# Patient Record
Sex: MALE | Race: WHITE | Employment: FULL TIME | URBAN - METROPOLITAN AREA
[De-identification: names, ages, dates, MRNs, and addresses within clinical notes are randomized per-mention and may not be internally consistent; named-entity substitution may affect disease eponyms.]

---

## 2022-01-31 ENCOUNTER — OFFICE VISIT (OUTPATIENT)
Dept: ORTHOPEDIC SURGERY | Age: 42
End: 2022-01-31
Payer: COMMERCIAL

## 2022-01-31 ENCOUNTER — HOSPITAL ENCOUNTER (OUTPATIENT)
Dept: ORTHOPEDIC SURGERY | Age: 42
Discharge: HOME | End: 2022-02-02
Payer: COMMERCIAL

## 2022-01-31 VITALS
OXYGEN SATURATION: 98 % | BODY MASS INDEX: 23.25 KG/M2 | HEIGHT: 75 IN | TEMPERATURE: 97.4 F | WEIGHT: 187 LBS | HEART RATE: 66 BPM

## 2022-01-31 DIAGNOSIS — M54.2 NECK PAIN: ICD-10-CM

## 2022-01-31 DIAGNOSIS — M54.12 CERVICAL RADICULOPATHY AT C6: Primary | ICD-10-CM

## 2022-01-31 PROCEDURE — 99204 OFFICE O/P NEW MOD 45 MIN: CPT | Performed by: ORTHOPAEDIC SURGERY

## 2022-01-31 PROCEDURE — 73030 X-RAY EXAM OF SHOULDER: CPT | Performed by: ORTHOPAEDIC SURGERY

## 2022-01-31 PROCEDURE — 72040 X-RAY EXAM NECK SPINE 2-3 VW: CPT

## 2022-01-31 PROCEDURE — 73070 X-RAY EXAM OF ELBOW: CPT

## 2022-01-31 PROCEDURE — 1036F TOBACCO NON-USER: CPT | Performed by: ORTHOPAEDIC SURGERY

## 2022-01-31 PROCEDURE — 72040 X-RAY EXAM NECK SPINE 2-3 VW: CPT | Performed by: ORTHOPAEDIC SURGERY

## 2022-01-31 PROCEDURE — G8427 DOCREV CUR MEDS BY ELIG CLIN: HCPCS | Performed by: ORTHOPAEDIC SURGERY

## 2022-01-31 PROCEDURE — 73070 X-RAY EXAM OF ELBOW: CPT | Performed by: ORTHOPAEDIC SURGERY

## 2022-01-31 PROCEDURE — 73030 X-RAY EXAM OF SHOULDER: CPT

## 2022-01-31 PROCEDURE — 73080 X-RAY EXAM OF ELBOW: CPT | Performed by: ORTHOPAEDIC SURGERY

## 2022-01-31 PROCEDURE — 73080 X-RAY EXAM OF ELBOW: CPT

## 2022-01-31 PROCEDURE — G8484 FLU IMMUNIZE NO ADMIN: HCPCS | Performed by: ORTHOPAEDIC SURGERY

## 2022-01-31 PROCEDURE — G8420 CALC BMI NORM PARAMETERS: HCPCS | Performed by: ORTHOPAEDIC SURGERY

## 2022-01-31 RX ORDER — OMEPRAZOLE 20 MG/1
CAPSULE, DELAYED RELEASE ORAL
COMMUNITY
Start: 2021-05-04

## 2022-01-31 RX ORDER — ALBUTEROL SULFATE 90 UG/1
2 AEROSOL, METERED RESPIRATORY (INHALATION)
COMMUNITY

## 2022-01-31 RX ORDER — DEXAMETHASONE 0.5 MG/1
0.5 TABLET ORAL EVERY 6 HOURS
Qty: 40 TABLET | Refills: 0 | Status: SHIPPED | OUTPATIENT
Start: 2022-01-31 | End: 2022-02-10

## 2022-01-31 RX ORDER — RIBOFLAVIN (VITAMIN B2) 100 MG
TABLET ORAL
COMMUNITY
Start: 2022-01-25

## 2022-01-31 RX ORDER — ERGOCALCIFEROL 1.25 MG/1
CAPSULE ORAL
COMMUNITY
Start: 2021-12-16

## 2022-01-31 RX ORDER — PAROXETINE 10 MG/1
TABLET, FILM COATED ORAL
COMMUNITY
Start: 2022-01-21

## 2022-01-31 NOTE — PROGRESS NOTES
Subjective:      Patient ID: Douglas Brody is a 46 y.o. male who presents today for:  Chief Complaint   Patient presents with    Shoulder Pain     left shoulder an elbow pain. No recent xray. He wears braces on both elbos with no relief. Pt states he has a very physical job. HPI  Patient essentially states he has pain everywhere. Reports tingling and pain shooting essentially from his neck down into his hands. Acknowledges weakness in his bilateral elbows as well as his left shoulder when lifting things. Denies any injury associated with the onset of these events and has been gradually increasing over time. Reports worse with lifting activities at work. History reviewed. No pertinent past medical history. History reviewed. No pertinent surgical history. Social History     Socioeconomic History    Marital status: Single     Spouse name: Not on file    Number of children: Not on file    Years of education: Not on file    Highest education level: Not on file   Occupational History    Not on file   Tobacco Use    Smoking status: Former Smoker     Packs/day: 2.00     Years: 20.00     Pack years: 40.00     Types: Cigarettes     Start date: 5/12/1998     Quit date: 5/12/2018     Years since quitting: 3.7    Smokeless tobacco: Former User   Vaping Use    Vaping Use: Every day   Substance and Sexual Activity    Alcohol use: Not Currently    Drug use: Not on file    Sexual activity: Not on file   Other Topics Concern    Not on file   Social History Narrative    Not on file     Social Determinants of Health     Financial Resource Strain:     Difficulty of Paying Living Expenses: Not on file   Food Insecurity:     Worried About [address] in the Last Year: Not on file    [address] in the Last Year: Not on file   Transportation Needs:     Lack of Transportation (Medical): Not on file    Lack of Transportation (Non-Medical):  Not on file   Physical Activity:     Days of Exercise per Week: Not on file    Minutes of Exercise per Session: Not on file   Stress:     Feeling of Stress : Not on file   Social Connections:     Frequency of Communication with Friends and Family: Not on file    Frequency of Social Gatherings with Friends and Family: Not on file    Attends Bahai Services: Not on file    Active Member of Clubs or Organizations: Not on file    Attends Club or Organization Meetings: Not on file    Marital Status: Not on file   Intimate Partner Violence:     Fear of Current or Ex-Partner: Not on file    Emotionally Abused: Not on file    Physically Abused: Not on file    Sexually Abused: Not on file   Housing Stability:     Unable to Pay for Housing in the Last Year: Not on file    Number of [place] in the Last Year: Not on file    Unstable Housing in the Last Year: Not on file     History reviewed. No pertinent family history. Allergies   Allergen Reactions    Penicillins      rash     Current Outpatient Medications on File Prior to Visit   Medication Sig Dispense Refill    PARoxetine (PAXIL) 10 MG tablet       Riboflavin (B-2) 100 MG TABS       omeprazole (PRILOSEC) 20 MG delayed release capsule TAKE 1 CAPSULE BY MOUTH EVERY DAY      vitamin D (ERGOCALCIFEROL) 1.25 MG (32129 UT) CAPS capsule       albuterol sulfate  (90 Base) MCG/ACT inhaler Inhale 2 puffs into the lungs       No current facility-administered medications on file prior to visit. Review of Systems      Objective:   Pulse 66   Temp 97.4 °F (36.3 °C) (Temporal)   Ht 6' 3\" (1.905 m)   Wt 187 lb (84.8 kg)   SpO2 98%   BMI 23.37 kg/m²     Ortho Exam    Left upper extremity:  Skin: Intact circumferentially, no swelling, ecchymosis or edema is appreciated  Neuro: Sensation intact light touch in the radial, ulnar and median nerve distribution. Able to perform all cardinal hand movements. Vascular: Strong palpable radial pulse, brisk cap refill in all digits.   Patient has symmetric 1+ biceps reflex bilaterally with a negative Rosendo sign. MSK: Patient is tender palpation about the paraspinal musculature in the left paraspinal region. Patient somewhat globally tender palpation about the left upper extremity and what appears to be C5 and C6 nerve distribution      Right upper extremity:  Skin: Intact circumferentially, no swelling, ecchymosis or edema is appreciated  Neuro: Sensation intact light touch in the radial, ulnar and median nerve distribution. Able to perform all cardinal hand movements. Patient has symmetric 1+ biceps reflex bilaterally with a negative Rosendo sign. Vascular: Strong palpable radial pulse, brisk cap refill in all digits. MSK:  Patient is tender palpation about the paraspinal musculature in the left paraspinal region.   Patient somewhat globally tender palpation about the left upper extremity and what appears to be C5 and C6 nerve distribution    Radiographs and Laboratory Studies:     Diagnostic Imaging Studies:    AP, oblique and lateral imaging the right elbow was obtained on 1/31/2022 to evaluate for causes of right elbow pain    Imaging demonstrates normal radiographic appearing elbow with no evidence of fracture or dislocation    AP, oblique and lateral imaging left elbow was obtained on 1/31/2020 225 for causes of left elbow pain    Imaging demonstrates normal radiograph appearing elbow with no evidence of fracture or dislocation    AP, axillary and scapular Y views of the left shoulder was obtained on 1/31/2022 to evaluate for cause of left shoulder pain    Imaging demonstrates prior healed fracture of the midshaft of the left clavicle which could be radiographic overlay given the been in the clavicle however no evidence of acute fracture dislocation and no evidence of glenohumeral arthritis    AP and lateral views of the cervical spine were obtained on 1/31/2022 to evaluate for causes of bilateral upper extremity pain    Imaging demonstrates advanced arthritic change with loss of lordotic curvature and advanced generative change with loss of disc space height between C5 and C6 and C6 and C7 with uncovertebral anterior osteophyte    Laboratory Studies:   No results found for: WBC, HGB, HCT, MCV, PLT  No results found for: SEDRATE  No results found for: CRP    Assessment:       Diagnosis Orders   1. Cervical radiculopathy at C6  Ambulatory referral to Physical Therapy   2. Neck pain  XR CERVICAL SPINE (2-3 VIEWS)    XR SHOULDER LEFT (MIN 2 VIEWS)    XR ELBOW RIGHT (MIN 3 VIEWS)    XR ELBOW LEFT (2 VIEWS)          Plan:     Patient appears to be experiencing bilateral cervical radicular pain as a result of arthritis in his neck with associated weakness. Patient has not experienced an injury and this is a chronic condition at baseline. I see no reason to place patient on any restrictions from my standpoint. Patient does have a very strenuous job and overall may be better for patient in the long run if he transitions to a less heavy duty type of work. However, I have no reason to restrict him from activity at this juncture. We will treat patient with a brief course of oral steroid medication to help with symptoms. If no improvement would recommend further evaluation with MRI of the cervical spine to evaluate for possible intervention.     Orders Placed This Encounter   Procedures    XR CERVICAL SPINE (2-3 VIEWS)     Standing Status:   Future     Number of Occurrences:   1     Standing Expiration Date:   1/31/2023     Scheduling Instructions:      AP and lateral     Order Specific Question:   Reason for exam:     Answer:   neck pain    XR SHOULDER LEFT (MIN 2 VIEWS)     Standing Status:   Future     Number of Occurrences:   1     Standing Expiration Date:   1/31/2023     Scheduling Instructions:      AP,  Outlet, axillary     Order Specific Question:   Reason for exam:     Answer:   shoulder pain    XR ELBOW RIGHT (MIN 3 VIEWS)     Standing Status: Future     Number of Occurrences:   1     Standing Expiration Date:   1/31/2023     Scheduling Instructions:      AP, lateral, oblique of right elbow     Order Specific Question:   Reason for exam:     Answer:   elbow pain    XR ELBOW LEFT (2 VIEWS)     Standing Status:   Future     Number of Occurrences:   1     Standing Expiration Date:   1/31/2023    Ambulatory referral to Physical Therapy     Referral Priority:   Routine     Referral Type:   Eval and Treat     Referral Reason:   Specialty Services Required     Requested Specialty:   Physical Therapy     Number of Visits Requested:   1     Orders Placed This Encounter   Medications    dexamethasone (DECADRON) 0.5 MG tablet     Sig: Take 1 tablet by mouth every 6 hours for 10 days     Dispense:  40 tablet     Refill:  0       No follow-ups on file.       Benigno Napoles MD

## 2023-12-20 ENCOUNTER — APPOINTMENT (OUTPATIENT)
Dept: PRIMARY CARE | Facility: CLINIC | Age: 43
End: 2023-12-20
Payer: COMMERCIAL

## 2024-11-18 ENCOUNTER — HOSPITAL ENCOUNTER (INPATIENT)
Age: 44
LOS: 9 days | Discharge: HOME | DRG: 885 | End: 2024-11-27
Attending: EMERGENCY MEDICINE | Admitting: PSYCHIATRY & NEUROLOGY
Payer: MEDICARE

## 2024-11-18 DIAGNOSIS — F20.0 PARANOID SCHIZOPHRENIA (HCC): Primary | ICD-10-CM

## 2024-11-18 LAB
ALBUMIN SERPL-MCNC: 4.3 G/DL (ref 3.5–4.6)
ALP SERPL-CCNC: 71 U/L (ref 35–104)
ALT SERPL-CCNC: 10 U/L (ref 0–41)
AMPHET UR QL SCN: NORMAL
ANION GAP SERPL CALCULATED.3IONS-SCNC: 6 MEQ/L (ref 9–15)
APAP SERPL-MCNC: <5 UG/ML (ref 10–30)
AST SERPL-CCNC: 15 U/L (ref 0–40)
BARBITURATES UR QL SCN: NORMAL
BASOPHILS # BLD: 0 K/UL (ref 0–0.2)
BASOPHILS NFR BLD: 0.2 %
BENZODIAZ UR QL SCN: NORMAL
BILIRUB SERPL-MCNC: <0.2 MG/DL (ref 0.2–0.7)
BILIRUB UR QL STRIP: NEGATIVE
BUN SERPL-MCNC: 20 MG/DL (ref 6–20)
CALCIUM SERPL-MCNC: 9.8 MG/DL (ref 8.5–9.9)
CANNABINOIDS UR QL SCN: NORMAL
CHLORIDE SERPL-SCNC: 104 MEQ/L (ref 95–107)
CHOLEST SERPL-MCNC: 174 MG/DL (ref 0–199)
CK SERPL-CCNC: 206 U/L (ref 0–190)
CLARITY UR: CLEAR
CO2 SERPL-SCNC: 30 MEQ/L (ref 20–31)
COCAINE UR QL SCN: NORMAL
COLOR UR: YELLOW
CREAT SERPL-MCNC: 1.1 MG/DL (ref 0.7–1.2)
DRUG SCREEN COMMENT UR-IMP: NORMAL
EOSINOPHIL # BLD: 0.1 K/UL (ref 0–0.7)
EOSINOPHIL NFR BLD: 1.3 %
ERYTHROCYTE [DISTWIDTH] IN BLOOD BY AUTOMATED COUNT: 12.1 % (ref 11.5–14.5)
ETHANOL PERCENT: NORMAL G/DL
ETHANOLAMINE SERPL-MCNC: <10 MG/DL (ref 0–0.08)
FENTANYL SCREEN, URINE: NORMAL
GLOBULIN SER CALC-MCNC: 3.5 G/DL (ref 2.3–3.5)
GLUCOSE SERPL-MCNC: 83 MG/DL (ref 70–99)
GLUCOSE UR STRIP-MCNC: NEGATIVE MG/DL
HCT VFR BLD AUTO: 44 % (ref 42–52)
HDLC SERPL-MCNC: 46 MG/DL (ref 40–59)
HGB BLD-MCNC: 14.7 G/DL (ref 14–18)
HGB UR QL STRIP: NEGATIVE
KETONES UR STRIP-MCNC: ABNORMAL MG/DL
LDLC SERPL CALC-MCNC: 100 MG/DL (ref 0–129)
LEUKOCYTE ESTERASE UR QL STRIP: NEGATIVE
LYMPHOCYTES # BLD: 1.4 K/UL (ref 1–4.8)
LYMPHOCYTES NFR BLD: 15.7 %
MCH RBC QN AUTO: 31.1 PG (ref 27–31.3)
MCHC RBC AUTO-ENTMCNC: 33.4 % (ref 33–37)
MCV RBC AUTO: 93.2 FL (ref 79–92.2)
METHADONE UR QL SCN: NORMAL
MONOCYTES # BLD: 0.6 K/UL (ref 0.2–0.8)
MONOCYTES NFR BLD: 6.2 %
NEUTROPHILS # BLD: 6.9 K/UL (ref 1.4–6.5)
NEUTS SEG NFR BLD: 76.4 %
NITRITE UR QL STRIP: NEGATIVE
OPIATES UR QL SCN: NORMAL
OXYCODONE UR QL SCN: NORMAL
PCP UR QL SCN: NORMAL
PH UR STRIP: 5.5 [PH] (ref 5–9)
PLATELET # BLD AUTO: 254 K/UL (ref 130–400)
POTASSIUM SERPL-SCNC: 4.5 MEQ/L (ref 3.4–4.9)
PROPOXYPH UR QL SCN: NORMAL
PROT SERPL-MCNC: 7.8 G/DL (ref 6.3–8)
PROT UR STRIP-MCNC: ABNORMAL MG/DL
RBC # BLD AUTO: 4.72 M/UL (ref 4.7–6.1)
SALICYLATES SERPL-MCNC: <0.3 MG/DL (ref 15–30)
SODIUM SERPL-SCNC: 140 MEQ/L (ref 135–144)
SP GR UR STRIP: 1.03 (ref 1–1.03)
TRIGL SERPL-MCNC: 141 MG/DL (ref 0–150)
TSH SERPL-MCNC: 1.57 UIU/ML (ref 0.44–3.86)
URINE REFLEX TO CULTURE: ABNORMAL
UROBILINOGEN UR STRIP-ACNC: 0.2 E.U./DL
WBC # BLD AUTO: 9.1 K/UL (ref 4.8–10.8)

## 2024-11-18 PROCEDURE — 82077 ASSAY SPEC XCP UR&BREATH IA: CPT

## 2024-11-18 PROCEDURE — 80179 DRUG ASSAY SALICYLATE: CPT

## 2024-11-18 PROCEDURE — 85025 COMPLETE CBC W/AUTO DIFF WBC: CPT

## 2024-11-18 PROCEDURE — 93005 ELECTROCARDIOGRAM TRACING: CPT | Performed by: EMERGENCY MEDICINE

## 2024-11-18 PROCEDURE — 6370000000 HC RX 637 (ALT 250 FOR IP): Performed by: PSYCHIATRY & NEUROLOGY

## 2024-11-18 PROCEDURE — 1240000000 HC EMOTIONAL WELLNESS R&B

## 2024-11-18 PROCEDURE — 84443 ASSAY THYROID STIM HORMONE: CPT

## 2024-11-18 PROCEDURE — 80061 LIPID PANEL: CPT

## 2024-11-18 PROCEDURE — 82550 ASSAY OF CK (CPK): CPT

## 2024-11-18 PROCEDURE — 81003 URINALYSIS AUTO W/O SCOPE: CPT

## 2024-11-18 PROCEDURE — 80307 DRUG TEST PRSMV CHEM ANLYZR: CPT

## 2024-11-18 PROCEDURE — 80143 DRUG ASSAY ACETAMINOPHEN: CPT

## 2024-11-18 PROCEDURE — 99285 EMERGENCY DEPT VISIT HI MDM: CPT

## 2024-11-18 PROCEDURE — 80053 COMPREHEN METABOLIC PANEL: CPT

## 2024-11-18 PROCEDURE — 83036 HEMOGLOBIN GLYCOSYLATED A1C: CPT

## 2024-11-18 RX ORDER — HYDROXYZINE PAMOATE 50 MG/1
50 CAPSULE ORAL EVERY 6 HOURS PRN
Status: DISCONTINUED | OUTPATIENT
Start: 2024-11-18 | End: 2024-11-27 | Stop reason: HOSPADM

## 2024-11-18 RX ORDER — HALOPERIDOL 5 MG/ML
5 INJECTION INTRAMUSCULAR EVERY 6 HOURS PRN
Status: DISCONTINUED | OUTPATIENT
Start: 2024-11-18 | End: 2024-11-27 | Stop reason: HOSPADM

## 2024-11-18 RX ORDER — MAGNESIUM HYDROXIDE/ALUMINUM HYDROXICE/SIMETHICONE 120; 1200; 1200 MG/30ML; MG/30ML; MG/30ML
30 SUSPENSION ORAL PRN
Status: DISCONTINUED | OUTPATIENT
Start: 2024-11-18 | End: 2024-11-27 | Stop reason: HOSPADM

## 2024-11-18 RX ORDER — TRAZODONE HYDROCHLORIDE 50 MG/1
50 TABLET, FILM COATED ORAL NIGHTLY PRN
Status: DISCONTINUED | OUTPATIENT
Start: 2024-11-18 | End: 2024-11-27 | Stop reason: HOSPADM

## 2024-11-18 RX ORDER — BENZTROPINE MESYLATE 1 MG/ML
2 INJECTION, SOLUTION INTRAMUSCULAR; INTRAVENOUS 2 TIMES DAILY PRN
Status: DISCONTINUED | OUTPATIENT
Start: 2024-11-18 | End: 2024-11-27 | Stop reason: HOSPADM

## 2024-11-18 RX ORDER — POLYETHYLENE GLYCOL 3350 17 G
2 POWDER IN PACKET (EA) ORAL
Status: DISCONTINUED | OUTPATIENT
Start: 2024-11-18 | End: 2024-11-27 | Stop reason: HOSPADM

## 2024-11-18 RX ORDER — ACETAMINOPHEN 325 MG/1
650 TABLET ORAL EVERY 4 HOURS PRN
Status: DISCONTINUED | OUTPATIENT
Start: 2024-11-18 | End: 2024-11-27 | Stop reason: HOSPADM

## 2024-11-18 RX ORDER — HALOPERIDOL 5 MG/1
5 TABLET ORAL EVERY 6 HOURS PRN
Status: DISCONTINUED | OUTPATIENT
Start: 2024-11-18 | End: 2024-11-27 | Stop reason: HOSPADM

## 2024-11-18 RX ORDER — HYDROXYZINE HYDROCHLORIDE 50 MG/ML
50 INJECTION, SOLUTION INTRAMUSCULAR EVERY 6 HOURS PRN
Status: DISCONTINUED | OUTPATIENT
Start: 2024-11-18 | End: 2024-11-27 | Stop reason: HOSPADM

## 2024-11-18 RX ADMIN — TRAZODONE HYDROCHLORIDE 50 MG: 50 TABLET ORAL at 22:32

## 2024-11-18 ASSESSMENT — SLEEP AND FATIGUE QUESTIONNAIRES
SLEEP PATTERN: DIFFICULTY FALLING ASLEEP;DISTURBED/INTERRUPTED SLEEP;RESTLESSNESS
DO YOU USE A SLEEP AID: NO
DO YOU HAVE DIFFICULTY SLEEPING: YES
AVERAGE NUMBER OF SLEEP HOURS: 8

## 2024-11-18 ASSESSMENT — PATIENT HEALTH QUESTIONNAIRE - PHQ9
SUM OF ALL RESPONSES TO PHQ QUESTIONS 1-9: 0
2. FEELING DOWN, DEPRESSED OR HOPELESS: NOT AT ALL
SUM OF ALL RESPONSES TO PHQ9 QUESTIONS 1 & 2: 0
SUM OF ALL RESPONSES TO PHQ QUESTIONS 1-9: 0
1. LITTLE INTEREST OR PLEASURE IN DOING THINGS: NOT AT ALL
SUM OF ALL RESPONSES TO PHQ QUESTIONS 1-9: 0
SUM OF ALL RESPONSES TO PHQ QUESTIONS 1-9: 0

## 2024-11-18 ASSESSMENT — PAIN - FUNCTIONAL ASSESSMENT: PAIN_FUNCTIONAL_ASSESSMENT: NONE - DENIES PAIN

## 2024-11-18 ASSESSMENT — LIFESTYLE VARIABLES
HOW OFTEN DO YOU HAVE A DRINK CONTAINING ALCOHOL: NEVER
HOW MANY STANDARD DRINKS CONTAINING ALCOHOL DO YOU HAVE ON A TYPICAL DAY: PATIENT DOES NOT DRINK

## 2024-11-19 LAB
EKG ATRIAL RATE: 62 BPM
EKG P AXIS: 55 DEGREES
EKG P-R INTERVAL: 162 MS
EKG Q-T INTERVAL: 416 MS
EKG QRS DURATION: 88 MS
EKG QTC CALCULATION (BAZETT): 422 MS
EKG R AXIS: 58 DEGREES
EKG T AXIS: 32 DEGREES
EKG VENTRICULAR RATE: 62 BPM
ESTIMATED AVERAGE GLUCOSE: 108 MG/DL
HBA1C MFR BLD: 5.4 % (ref 4–6)

## 2024-11-19 PROCEDURE — 6370000000 HC RX 637 (ALT 250 FOR IP): Performed by: PSYCHIATRY & NEUROLOGY

## 2024-11-19 PROCEDURE — 1240000000 HC EMOTIONAL WELLNESS R&B

## 2024-11-19 PROCEDURE — 93010 ELECTROCARDIOGRAM REPORT: CPT | Performed by: INTERNAL MEDICINE

## 2024-11-19 PROCEDURE — 99223 1ST HOSP IP/OBS HIGH 75: CPT | Performed by: PSYCHIATRY & NEUROLOGY

## 2024-11-19 RX ORDER — DIVALPROEX SODIUM 250 MG/1
250 TABLET, DELAYED RELEASE ORAL EVERY 8 HOURS SCHEDULED
Status: DISCONTINUED | OUTPATIENT
Start: 2024-11-19 | End: 2024-11-24

## 2024-11-19 RX ORDER — PALIPERIDONE 6 MG/1
6 TABLET, EXTENDED RELEASE ORAL DAILY
Status: DISCONTINUED | OUTPATIENT
Start: 2024-11-19 | End: 2024-11-21

## 2024-11-19 RX ADMIN — TRAZODONE HYDROCHLORIDE 50 MG: 50 TABLET ORAL at 21:12

## 2024-11-19 RX ADMIN — DIVALPROEX SODIUM 250 MG: 250 TABLET, DELAYED RELEASE ORAL at 13:37

## 2024-11-19 RX ADMIN — DIVALPROEX SODIUM 250 MG: 250 TABLET, DELAYED RELEASE ORAL at 21:12

## 2024-11-19 RX ADMIN — PALIPERIDONE 6 MG: 6 TABLET, EXTENDED RELEASE ORAL at 09:58

## 2024-11-19 ASSESSMENT — PATIENT HEALTH QUESTIONNAIRE - PHQ9
SUM OF ALL RESPONSES TO PHQ QUESTIONS 1-9: 2
1. LITTLE INTEREST OR PLEASURE IN DOING THINGS: SEVERAL DAYS
SUM OF ALL RESPONSES TO PHQ QUESTIONS 1-9: 2
SUM OF ALL RESPONSES TO PHQ9 QUESTIONS 1 & 2: 2
SUM OF ALL RESPONSES TO PHQ QUESTIONS 1-9: 2
2. FEELING DOWN, DEPRESSED OR HOPELESS: SEVERAL DAYS
SUM OF ALL RESPONSES TO PHQ QUESTIONS 1-9: 2

## 2024-11-19 NOTE — ED PROVIDER NOTES
[facility] ED  EMERGENCY DEPARTMENT ENCOUNTER      Pt Name: Raúl Fisher  MRN: [id number]  Birthdate 1980  Date of evaluation: 11/18/2024  Provider: Constantine Briceño MD  9:31 PM    CHIEF COMPLAINT       Chief Complaint   Patient presents with    Mental Health Problem     Paranoid, auditory hallucinations, bizarre behaviors, not taking medications.         HISTORY OF PRESENT ILLNESS    Raúl Fsiher is a 44 y.o. male who presents to the emergency department via EMS.  The patient tells me that he is here for help because his last medications were not helping.  He stopped taking them more than a week ago.  He denies SI/HI/AVH.  However his family gives ancillary information that he has been making bizarre statements, making the neighbors nervous, making family feel threatened.  He denies acute medical complaint otherwise.  Please see psychiatric nurse documentation    HPI  Chart review notes previous prescriptions for Paxil  Nursing Notes were reviewed.    REVIEW OF SYSTEMS       Review of Systems   Unable to perform ROS: Psychiatric disorder       Except as noted above the remainder of the review of systems was reviewed and negative.       PAST MEDICAL HISTORY   History reviewed. No pertinent past medical history.      SURGICAL HISTORY     History reviewed. No pertinent surgical history.      CURRENT MEDICATIONS       Previous Medications    ALBUTEROL SULFATE  (90 BASE) MCG/ACT INHALER    Inhale 2 puffs into the lungs    OMEPRAZOLE (PRILOSEC) 20 MG DELAYED RELEASE CAPSULE    TAKE 1 CAPSULE BY MOUTH EVERY DAY    PAROXETINE (PAXIL) 10 MG TABLET        RIBOFLAVIN (B-2) 100 MG TABS        VITAMIN D (ERGOCALCIFEROL) 1.25 MG (51220 UT) CAPS CAPSULE           ALLERGIES     Penicillins    FAMILY HISTORY     History reviewed. No pertinent family history.       SOCIAL HISTORY       Social History     Socioeconomic History    Marital status: Single     Spouse name: None    Number of children: None

## 2024-11-19 NOTE — CONSULTS
non-tender  MUSCULOSKELETAL:  There is no redness, warmth, or swelling of the joints.  Full range of motion noted  NEUROLOGIC:  Awake, alert.  No focal deficits noted  NEUROLOGIC:alert; no deficits  CNII:pupils are reactive to light  CN II, IV, VI-no eye deviation, no diplopia or ptosis  CN V-facial:sensation intact   CN IIIV: Hearing normal  CN IX, X:  Normal gag reflex & phonation  CN XI:Shruggs shoulder & neck rotation intact   CNXII:Tongue midline; no deviation/atrophy  SKIN:  No bruising or bleeding, normal skin color, texture, turgor, no redness, warmth, or swelling, no rashes, and no lesions    Labs    CBC:  Recent Labs     11/18/24 1909   WBC 9.1   RBC 4.72   HGB 14.7   HCT 44.0   MCV 93.2*   RDW 12.1        CHEMISTRIES:  Recent Labs     11/18/24 1909      K 4.5      CO2 30   BUN 20   CREATININE 1.10   GLUCOSE 83     PT/INR:No results for input(s): \"PROTIME\", \"INR\" in the last 72 hours.  APTT:No results for input(s): \"APTT\" in the last 72 hours.  LIVER PROFILE:  Recent Labs     11/18/24 1909   AST 15   ALT 10   BILITOT <0.2   ALKPHOS 71       Imaging/Diagnostics   No results found.    Assessment      Hospital Problems             Last Modified POA    * (Principal) Paranoid schizophrenia (HCC) 11/18/2024 Yes       Plan   Paranoid schizophrenia  -Psychiatry    Elevated CK; 200's  -Increase fluids    Neutrophil 6.9; afebrile  -Denies s/s of infection  -Labs and imaging as needed    Tobacco use  -Commit lozenges as needed; advised to quit smoking    Thank you for consult.  Additional work up or/and treatment plan may be added today or then after based on clinical progression by other providers or specialists.  Patient will need to follow-up with PCP for chronic disease management.     I have spent greater than 70% of time  spent focused exclusively on this patient ,reviewing  chart, labs/diagnostics, reconciling medications, &  answering questions with patient and discussing

## 2024-11-19 NOTE — ED NOTES
Collateral obtained from patient's family:     Mother Avelina Fraser [phone]     Stepfather Boubacar Fraser  [phone]     Brother Cruz Fisher  [phone]    Family states that neighbors \"banged on the door at the house today saying the patient was going into the neighbor's back yard (they see him on their cameras), said patient was saying he wanted to date the 15 year-old daughter of the neighbor, was leaving 'presents' like candy on the neighbor's front porch.\" Neighbors stated to patient's family that it had been going on for awhile. Per family, there was no actual contact between the patient and the 15 year-old. After going to the patient's house, the neighbor's went home and called the police. While neighbor was leaving the patient's home, family states he [the patient] threatened them, saying \"let's take this outside.\" Family was able to redirect patient without difficulty. Family reports that patient has had outbursts and \"lunged at his stepfather\" a couple of days ago. Patient's mother had to get in between them. Patient's brother reports that when patient has his outbursts, it \"takes 5-6 people to hold him down.\" They report that patient has never truly harmed anyone, but he can appear very threatening.    Family reports that patient has been off his medications for at least a week, and \"actually took all of his meds and dropped them off at the police station yesterday.\" Parents and brother state that when patient takes his medications \"he acts totally normal.\" Patient's mother states he told her he quit taking his medications because \"they gave him a headache and make him ejaculate.\" Family states that patient has a history of stopping his meds and they feel it would be better if her were to receive injections instead to ensure compliance. None of patient's family members know what medications patient is supposed to be taking. Family reports that patient had been connected with the [facility]

## 2024-11-19 NOTE — ED NOTES
ED provider Dr Briceño informed of decision to admit.    Call to 3W, spoke with charge nurse Nasrin. Patient will go to room 367.

## 2024-11-19 NOTE — ED NOTES
Patient to 3W accompanied by [facility] RN and [facility] [employer].  All belongings, paperwork, and pink slip given to 3W staff.

## 2024-11-19 NOTE — H&P
[facility] - Department of Psychiatry    History and Physical - Adult         CHIEF COMPLAINT:  Psychosis    History obtained from:  patient    Patient was seen after discussing with the treatment team and reviewing the chart        CIRCUMSTANCES OF ADMISSION:     44M patient with a history of schizophrenia and bipolar disorder presented to ER after contacting the police from home when neighbors confronted him about some of his actions. Patient has no SI, no HI. Patient is restless and hyperactive. Patient is guarded, suspicious, with rapid and pressured speech. Thoughts are disorganized, tangential, and patient jumps from topic to topic with no apparent association between topics. Patient is exhibiting A/V hallucinations and paranoid delusions. Patient reports \"hearing lots of voices through his telephone.\" He says he saw \"neighbors breaking into the house two weeks ago, but no one else knows but me.\" Patient reports \"having a seal on his forehead that disappeared\" and states \"I need to get my head taken care of.\" Patient states \"I have a different heart than anyone else.\" Patient reports that the house that he lives in is \"wired for the past 95 years\" and states \"I know the whole house is bugged.\" Patient states \"I even cleaned out the  drain and I should have told the [facility] about it.\" Patient has difficulty responding to many questions due to his preoccupations/delusions.     Patient was briefly connected to the [facility], but [facility] states patient was discharged from services in early 2024. Prior to DC, patient had been receiving both psychiatry and counseling services. Patient does not have a current mental health provider. Family reports patient stopped taking his medications at least a week ago, but possibly longer. Family reports that when patient consistently takes his medications, \"he acts normal.\" Patient/family do not know what medications patient is supposed to be on.

## 2024-11-19 NOTE — FLOWSHEET NOTE
Pt rates his anxiety and depression 1/10. Pt attends groups, eats meals in dinning room rates his appetite as good, Pt socializes with select peers, Watches TV in the day room. Pt denies SI,HI,AVH. Will continue to monitor.

## 2024-11-19 NOTE — ED NOTES
Patient requested that his mother Avelina and brother Cruz be notified of his admission and gave permission that his HIPAA code be provided to his mother and brother.    Call placed to patient's mother; no answer and mailbox full.    Call placed to patient's brother Cruz. Gave admitting information, [facility] visiting hours/contact info, and HIPAA code. Brother states he will share information with his mother. Brother is very concerned that patient \"not be put on a whole bunch of medications he will just stop taking after he gets better.\" Brother continues to state that they (the family) feel that patient needs a long-acting injection rather than multiple pills. Brother was advised that the psychiatrist would work with patient to ensure that he receives the best medications to manage his symptoms. Brother expressed gratitude for the update.

## 2024-11-19 NOTE — ED NOTES
Provisional Diagnosis:       Schizophrenia, bipolar disorder, paranoid delusions. A/V hallucinations    Psychosocial and Contextual Factors:       Patient was born in [place] and graduated from high school there. He is currently staying in the basement of his parent's home in [place]. Patient is not employed and is not . He receives SSI disability payments.    C-SSRS Summary:    Risk of Suicide: No Risk  1) Within the past month, have you wished you were dead or wished you could go to sleep and not wake up? : No  2) Have you actually had any thoughts of killing yourself? : No  6) Have you ever done anything, started to do anything, or prepared to do anything to end your life?: No     Patient: Patient presents as guarded, suspicious, with rapid and pressured speech. Thoughts are disorganized, tangential, and patient jumps from topic to topic with no apparent association between topics. Patient is exhibiting A/V hallucinations and paranoid delusions. Eye contact is fair. Patient is restless and hyperactive. Hygiene is fair.    Family: Patient's mother, stepfather, and brother present in ER. Family appear to be overwhelmed and upset due to patient's current mental state. Refer to associated collateral note.    Collateral obtained from patient's family:     Mother Avelina Fraser [phone]     Stepfather Boubacar Fraser  [phone]     Brother Cruz Fisher  [phone]     Agency: Patient was briefly connected to the [facility]. Call placed to [facility]; [facility] states patient was discharged from services in early 2024 but prior to DC had been receiving both psychiatry and counseling services. Patient does not have a current mental health provider.    Substance Abuse: Patient reports he does not drink or use drugs at this time. ETOH <10, tox screen negative.    Present Suicidal Behavior:       Verbal: Denies     Attempt: N/A    Past Suicidal Behavior:      Verbal: Denies, none found in records.     Attempt:

## 2024-11-19 NOTE — ED NOTES
Patient notified of plan for admission which will include getting him on the correct medications and working with the psychiatrist and treatment team. Patient responded \"Hey, you work with me and I'll work with you.\" Patient was asked if he is wanting to be admitted and willing to take prescribed medications to which he replied \"I am.\" Patient was advised that he will be going to the third floor for treatment. Patient remains cooperative, talking and laughing with staff, states \"You two [referring to two [facility] nurses] are identical twins, I can tell. You must be from [place].\"

## 2024-11-19 NOTE — ED NOTES
Call to Dr Recinos to review patient's reason for admission, psychiatric history, recent med noncompliance, collateral information provided by family, labs/EKG, and patient's current statements and behaviors. Per Dr Recinos, as long as patient is willing to be admitted and take medications, he may go to 3W with standard PRN medications. Spoke with patient and verified patient is wanting treatment/meds.

## 2024-11-19 NOTE — ED NOTES
Patient to [facility]. Changed into psych safe clothing. Skin Intact but rash noted to abdomen.  No contraband found at this time. Belongings secured and placed into locker 5. Lab notified. Zone 2 notified of new patient. Urine specimen obtained and sent to lab.

## 2024-11-20 PROCEDURE — 99232 SBSQ HOSP IP/OBS MODERATE 35: CPT | Performed by: PSYCHIATRY & NEUROLOGY

## 2024-11-20 PROCEDURE — 6370000000 HC RX 637 (ALT 250 FOR IP): Performed by: PSYCHIATRY & NEUROLOGY

## 2024-11-20 PROCEDURE — 1240000000 HC EMOTIONAL WELLNESS R&B

## 2024-11-20 RX ADMIN — PALIPERIDONE 6 MG: 6 TABLET, EXTENDED RELEASE ORAL at 08:49

## 2024-11-20 RX ADMIN — DIVALPROEX SODIUM 250 MG: 250 TABLET, DELAYED RELEASE ORAL at 13:59

## 2024-11-20 RX ADMIN — TRAZODONE HYDROCHLORIDE 50 MG: 50 TABLET ORAL at 21:00

## 2024-11-20 RX ADMIN — DIVALPROEX SODIUM 250 MG: 250 TABLET, DELAYED RELEASE ORAL at 21:54

## 2024-11-20 RX ADMIN — DIVALPROEX SODIUM 250 MG: 250 TABLET, DELAYED RELEASE ORAL at 05:52

## 2024-11-20 NOTE — FLOWSHEET NOTE
Pt has been visible out on the unit at intervals.Pt does attend select groups. Pt states he is here because some men keep coming into his house and telling lies  about him. Pt has been calm and cooperative . Pt is tangential .Pt states that his house is \"wired\". Pt is encouraged to attend groups and other milieu activities.Pt denies suicidal ideation,intent or harm . Pt denies audio-visual hallucinations. Pt denies HI.

## 2024-11-21 PROBLEM — F25.0 SCHIZOAFFECTIVE DISORDER, BIPOLAR TYPE (HCC): Status: ACTIVE | Noted: 2024-11-18

## 2024-11-21 PROCEDURE — 1240000000 HC EMOTIONAL WELLNESS R&B

## 2024-11-21 PROCEDURE — 6370000000 HC RX 637 (ALT 250 FOR IP): Performed by: PSYCHIATRY & NEUROLOGY

## 2024-11-21 PROCEDURE — 99232 SBSQ HOSP IP/OBS MODERATE 35: CPT | Performed by: PSYCHIATRY & NEUROLOGY

## 2024-11-21 RX ORDER — PALIPERIDONE 9 MG/1
9 TABLET, EXTENDED RELEASE ORAL DAILY
Status: DISCONTINUED | OUTPATIENT
Start: 2024-11-22 | End: 2024-11-24

## 2024-11-21 RX ADMIN — DIVALPROEX SODIUM 250 MG: 250 TABLET, DELAYED RELEASE ORAL at 06:16

## 2024-11-21 RX ADMIN — DIVALPROEX SODIUM 250 MG: 250 TABLET, DELAYED RELEASE ORAL at 14:22

## 2024-11-21 RX ADMIN — PALIPERIDONE 6 MG: 6 TABLET, EXTENDED RELEASE ORAL at 09:46

## 2024-11-21 RX ADMIN — DIVALPROEX SODIUM 250 MG: 250 TABLET, DELAYED RELEASE ORAL at 21:53

## 2024-11-21 NOTE — FLOWSHEET NOTE
Mother called for an update. HIPAA code provided. Mother worried about patient not being compliant with medications at home. Mother's phone number added to emergency contact list.

## 2024-11-21 NOTE — FLOWSHEET NOTE
Pt has been visible out on the unit at intervals. Pt is social with peers and has a bright affect. Pt endorses adequate sleep and appetite. Pt denies suicidal ideation,intent or plan. Pt denies HI.

## 2024-11-22 LAB — VALPROATE SERPL-MCNC: 35.3 UG/ML (ref 50–100)

## 2024-11-22 PROCEDURE — 80164 ASSAY DIPROPYLACETIC ACD TOT: CPT

## 2024-11-22 PROCEDURE — 1240000000 HC EMOTIONAL WELLNESS R&B

## 2024-11-22 PROCEDURE — 99232 SBSQ HOSP IP/OBS MODERATE 35: CPT | Performed by: PSYCHIATRY & NEUROLOGY

## 2024-11-22 PROCEDURE — 6370000000 HC RX 637 (ALT 250 FOR IP): Performed by: PSYCHIATRY & NEUROLOGY

## 2024-11-22 PROCEDURE — 36415 COLL VENOUS BLD VENIPUNCTURE: CPT

## 2024-11-22 RX ADMIN — DIVALPROEX SODIUM 250 MG: 250 TABLET, DELAYED RELEASE ORAL at 13:42

## 2024-11-22 RX ADMIN — PALIPERIDONE 9 MG: 9 TABLET, EXTENDED RELEASE ORAL at 09:37

## 2024-11-22 RX ADMIN — DIVALPROEX SODIUM 250 MG: 250 TABLET, DELAYED RELEASE ORAL at 07:09

## 2024-11-22 NOTE — FLOWSHEET NOTE
Pt has been visible out on the unit and eats meals in the dinning area. Pt endorses fair sleep and improved appetite. Patient showers independently.and ADL'S are intact. Pt does have disorganized thinking and appears internally stimulated at times. Pt is encouraged to attend groups and other milieu activities. Pt denies audio-visual hallucinations. Patient denies SI/HI/  ideation.

## 2024-11-23 PROCEDURE — 6370000000 HC RX 637 (ALT 250 FOR IP): Performed by: PSYCHIATRY & NEUROLOGY

## 2024-11-23 PROCEDURE — 1240000000 HC EMOTIONAL WELLNESS R&B

## 2024-11-23 RX ADMIN — PALIPERIDONE 9 MG: 9 TABLET, EXTENDED RELEASE ORAL at 08:49

## 2024-11-23 RX ADMIN — DIVALPROEX SODIUM 250 MG: 250 TABLET, DELAYED RELEASE ORAL at 21:52

## 2024-11-24 PROCEDURE — 6370000000 HC RX 637 (ALT 250 FOR IP): Performed by: PSYCHIATRY & NEUROLOGY

## 2024-11-24 PROCEDURE — 1240000000 HC EMOTIONAL WELLNESS R&B

## 2024-11-24 RX ORDER — DIVALPROEX SODIUM 500 MG/1
500 TABLET, DELAYED RELEASE ORAL EVERY 12 HOURS SCHEDULED
Status: DISCONTINUED | OUTPATIENT
Start: 2024-11-25 | End: 2024-11-27 | Stop reason: HOSPADM

## 2024-11-24 RX ORDER — PALIPERIDONE 6 MG/1
12 TABLET, EXTENDED RELEASE ORAL DAILY
Status: DISCONTINUED | OUTPATIENT
Start: 2024-11-25 | End: 2024-11-27 | Stop reason: HOSPADM

## 2024-11-24 RX ADMIN — PALIPERIDONE 9 MG: 9 TABLET, EXTENDED RELEASE ORAL at 10:04

## 2024-11-24 RX ADMIN — TRAZODONE HYDROCHLORIDE 50 MG: 50 TABLET ORAL at 20:58

## 2024-11-24 RX ADMIN — DIVALPROEX SODIUM 250 MG: 250 TABLET, DELAYED RELEASE ORAL at 06:01

## 2024-11-24 NOTE — FLOWSHEET NOTE
Pt denies having any anxiety or depression, Pt attends groups and participate, Pt is social with select peers, Pt is visible on unit, watches TV with his peers and eats his meals in dinning room, Rates his appetite as good. Pt denies SI,HI,AVH. Will continue to monitor

## 2024-11-24 NOTE — FLOWSHEET NOTE
Pt rates his anxiety and depression 0/10. Pt attends groups, eats meals in dinning room rates his appetite as good, Pt socializes with select peers, Watches TV in the day room. Pt denies SI,HI,AVH. Will continue to monitor.

## 2024-11-25 PROCEDURE — 99232 SBSQ HOSP IP/OBS MODERATE 35: CPT | Performed by: PSYCHIATRY & NEUROLOGY

## 2024-11-25 PROCEDURE — 6370000000 HC RX 637 (ALT 250 FOR IP): Performed by: NURSE PRACTITIONER

## 2024-11-25 PROCEDURE — 1240000000 HC EMOTIONAL WELLNESS R&B

## 2024-11-25 RX ADMIN — DIVALPROEX SODIUM 500 MG: 250 TABLET, DELAYED RELEASE ORAL at 21:37

## 2024-11-25 RX ADMIN — PALIPERIDONE 12 MG: 6 TABLET, EXTENDED RELEASE ORAL at 09:34

## 2024-11-25 ASSESSMENT — PAIN DESCRIPTION - LOCATION: LOCATION: HEAD

## 2024-11-25 ASSESSMENT — PAIN SCALES - GENERAL: PAINLEVEL_OUTOF10: 1

## 2024-11-25 ASSESSMENT — PAIN DESCRIPTION - DESCRIPTORS: DESCRIPTORS: ACHING

## 2024-11-25 NOTE — FLOWSHEET NOTE
Pt has been intermittently visible out on the unit for meals and watches TV with other peers. Pt endorses adequate sleep and appetite. Pt is encouraged to attend groups and other milieu activities. Pt has been medication compliant and denies suicidal ideation,intent or harm. Pt denies audiovisual hallucinations.

## 2024-11-26 PROCEDURE — 1240000000 HC EMOTIONAL WELLNESS R&B

## 2024-11-26 PROCEDURE — 6370000000 HC RX 637 (ALT 250 FOR IP): Performed by: NURSE PRACTITIONER

## 2024-11-26 PROCEDURE — 90833 PSYTX W PT W E/M 30 MIN: CPT | Performed by: PSYCHIATRY & NEUROLOGY

## 2024-11-26 PROCEDURE — 99232 SBSQ HOSP IP/OBS MODERATE 35: CPT | Performed by: PSYCHIATRY & NEUROLOGY

## 2024-11-26 RX ADMIN — PALIPERIDONE 12 MG: 6 TABLET, EXTENDED RELEASE ORAL at 08:57

## 2024-11-26 RX ADMIN — DIVALPROEX SODIUM 500 MG: 250 TABLET, DELAYED RELEASE ORAL at 08:57

## 2024-11-26 RX ADMIN — DIVALPROEX SODIUM 500 MG: 250 TABLET, DELAYED RELEASE ORAL at 21:19

## 2024-11-26 NOTE — FLOWSHEET NOTE
Pt has been intermittently visible out on the unit and attends groups.Pt reports adequate sleep and appetite. Pt has been medication compliant ,no request for prn medication.Patient played corn hole for group. Pt denies suicidal ideation,intent or plan. Pt denies audiovisual hallucinations.

## 2024-11-27 VITALS
HEART RATE: 82 BPM | BODY MASS INDEX: 23.55 KG/M2 | RESPIRATION RATE: 14 BRPM | HEIGHT: 75 IN | WEIGHT: 189.38 LBS | SYSTOLIC BLOOD PRESSURE: 134 MMHG | DIASTOLIC BLOOD PRESSURE: 87 MMHG | OXYGEN SATURATION: 99 % | TEMPERATURE: 97.4 F

## 2024-11-27 PROCEDURE — 6370000000 HC RX 637 (ALT 250 FOR IP): Performed by: NURSE PRACTITIONER

## 2024-11-27 PROCEDURE — 6360000002 HC RX W HCPCS: Performed by: PSYCHIATRY & NEUROLOGY

## 2024-11-27 PROCEDURE — G0008 ADMIN INFLUENZA VIRUS VAC: HCPCS | Performed by: PSYCHIATRY & NEUROLOGY

## 2024-11-27 PROCEDURE — 99239 HOSP IP/OBS DSCHRG MGMT >30: CPT | Performed by: PSYCHIATRY & NEUROLOGY

## 2024-11-27 PROCEDURE — 90656 IIV3 VACC NO PRSV 0.5 ML IM: CPT | Performed by: PSYCHIATRY & NEUROLOGY

## 2024-11-27 RX ORDER — DIVALPROEX SODIUM 500 MG/1
500 TABLET, DELAYED RELEASE ORAL EVERY 12 HOURS SCHEDULED
Qty: 30 TABLET | Refills: 3 | Status: SHIPPED | OUTPATIENT
Start: 2024-11-27

## 2024-11-27 RX ORDER — PALIPERIDONE 6 MG/1
12 TABLET, EXTENDED RELEASE ORAL DAILY
Qty: 30 TABLET | Refills: 0 | Status: SHIPPED | OUTPATIENT
Start: 2024-11-28

## 2024-11-27 RX ADMIN — DIVALPROEX SODIUM 500 MG: 250 TABLET, DELAYED RELEASE ORAL at 08:55

## 2024-11-27 RX ADMIN — INFLUENZA A VIRUS A/VICTORIA/4897/2022 IVR-238 (H1N1) ANTIGEN (PROPIOLACTONE INACTIVATED), INFLUENZA A VIRUS A/THAILAND/8/2022 IVR-237 (H3N2) ANTIGEN (PROPIOLACTONE INACTIVATED), INFLUENZA B VIRUS B/AUSTRIA/1359417/2021 BVR-26 ANTIGEN (PROPIOLACTONE INACTIVATED) 0.5 ML: 15; 15; 15 INJECTION, SUSPENSION INTRAMUSCULAR at 11:30

## 2024-11-27 RX ADMIN — PALIPERIDONE 12 MG: 6 TABLET, EXTENDED RELEASE ORAL at 08:55

## 2024-11-27 NOTE — DISCHARGE INSTR - DIET

## 2024-11-27 NOTE — DISCHARGE INSTRUCTIONS
Someone from [facility] will be calling you tomorrow to follow up on your care. If you don't hear from us, give us a call! [phone].    Keep all follow up appointments, take medications as ordered, utilize positive supports, abstain from use of alcohol and drugs. If symptoms return or you feel at risk to yourself or others, please call 911, return the nearest emergency room, or call your local crisis hotline:  [facility]: [phone]  [facility]: [phone]  [facility]: [phone]    Due to the Covid-19 Pandemic, [facility]oking [facility]essation [facility] is not currently available. For assistance with quitting smoking please go to https://smokefree.gov. A prescription for an FDA-approved tobacco cessation medication was offered at discharge and the patient refused.    You were given a flu vaccine
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
## 2024-11-27 NOTE — PROGRESS NOTES
[facility]  BEHAVIORAL HEALTH FOLLOW-UP NOTE       2024     Patient was seen and examined in person, Chart reviewed   Patient's case discussed with staff/team    Chief Complaint: Psychosis    Interim History:   Patient is seen sitting at a table by himself in the milieu.  He remains disorganized not making much sense.  He tells me that he is just \"waiting for my surgery.\"  I asked what surgery he is having done he states he has to have his brain sealed and then starts talking about his family moving closer and talk at the house being bugged.  He is disorganized flights of ideas he is delusional and illogical.  He had refused the Depakote yesterday indicating that it was for seizures but he started taking it again today.      Appetite:   [] Normal/Unchanged  [] Increased  [x] Decreased      Sleep:       [] Normal/Unchanged  [x] Fair       [] Poor              Energy:    [] Normal/Unchanged  [] Increased  [x] Decreased        SI [] Present  [x] Absent    HI  []Present  [x] Absent     Aggression:  [] yes  [x] no    Patient is [x] able  [] unable to CONTRACT FOR SAFETY     PAST MEDICAL/PSYCHIATRIC HISTORY:   History reviewed. No pertinent past medical history.    FAMILY/SOCIAL HISTORY:  History reviewed. No pertinent family history.  Social History     Socioeconomic History    Marital status: Single     Spouse name: Not on file    Number of children: Not on file    Years of education: Not on file    Highest education level: Not on file   Occupational History    Not on file   Tobacco Use    Smoking status: Former     Current packs/day: 0.00     Average packs/day: 2.0 packs/day for 20.0 years (40.0 ttl pk-yrs)     Types: Cigarettes     Start date: 1998     Quit date: 2018     Years since quittin.5    Smokeless tobacco: Former   Vaping Use    Vaping status: Every Day   Substance and Sexual Activity    Alcohol use: Not Currently    Drug use: Not Currently    Sexual activity: Not 
               [facility]  BEHAVIORAL HEALTH FOLLOW-UP NOTE       2024     Patient was seen and examined in person, Chart reviewed   Patient's case discussed with staff/team    Chief Complaint: Psychosis    Interim History:   Patient seen up on the unit he remains paranoid psychotic internally stimulated.  He is disorganized makes bizarre statements about State law and not allowing you to have your cell phone or to have house codes is not working.  He is refusing Depakote stating it is for seizures and he does not have seizures.  He is concrete would not allow you to explain that the medication will also help with mood.  I offer him lithium and he also refuses that.  He says by himself preoccupied bizarre paranoid and internally stimulated      Appetite:   [] Normal/Unchanged  [] Increased  [x] Decreased      Sleep:       [] Normal/Unchanged  [x] Fair       [] Poor              Energy:    [] Normal/Unchanged  [] Increased  [x] Decreased        SI [] Present  [x] Absent    HI  []Present  [x] Absent     Aggression:  [] yes  [x] no    Patient is [x] able  [] unable to CONTRACT FOR SAFETY     PAST MEDICAL/PSYCHIATRIC HISTORY:   History reviewed. No pertinent past medical history.    FAMILY/SOCIAL HISTORY:  History reviewed. No pertinent family history.  Social History     Socioeconomic History    Marital status: Single     Spouse name: Not on file    Number of children: Not on file    Years of education: Not on file    Highest education level: Not on file   Occupational History    Not on file   Tobacco Use    Smoking status: Former     Current packs/day: 0.00     Average packs/day: 2.0 packs/day for 20.0 years (40.0 ttl pk-yrs)     Types: Cigarettes     Start date: 1998     Quit date: 2018     Years since quittin.5    Smokeless tobacco: Former   Vaping Use    Vaping status: Every Day   Substance and Sexual Activity    Alcohol use: Not Currently    Drug use: Not Currently    Sexual 
               [facility]  BEHAVIORAL HEALTH FOLLOW-UP NOTE       2024     Patient was seen and examined in person, Chart reviewed   Patient's case discussed with staff/team    Chief Complaint: Psychosis    Interim History:     No change in his presentation since yesterday  Mood anxious  Disorganized thought process  Paranoid thoughts ++  Internally stimulated  Appetite:   [] Normal/Unchanged  [] Increased  [x] Decreased      Sleep:       [] Normal/Unchanged  [x] Fair       [] Poor              Energy:    [] Normal/Unchanged  [] Increased  [x] Decreased        SI [] Present  [x] Absent    HI  []Present  [x] Absent     Aggression:  [] yes  [x] no    Patient is [x] able  [] unable to CONTRACT FOR SAFETY     PAST MEDICAL/PSYCHIATRIC HISTORY:   History reviewed. No pertinent past medical history.    FAMILY/SOCIAL HISTORY:  History reviewed. No pertinent family history.  Social History     Socioeconomic History    Marital status: Single     Spouse name: Not on file    Number of children: Not on file    Years of education: Not on file    Highest education level: Not on file   Occupational History    Not on file   Tobacco Use    Smoking status: Former     Current packs/day: 0.00     Average packs/day: 2.0 packs/day for 20.0 years (40.0 ttl pk-yrs)     Types: Cigarettes     Start date: 1998     Quit date: 2018     Years since quittin.5    Smokeless tobacco: Former   Vaping Use    Vaping status: Every Day   Substance and Sexual Activity    Alcohol use: Not Currently    Drug use: Not Currently    Sexual activity: Not on file   Other Topics Concern    Not on file   Social History Narrative    Not on file     Social Determinants of Health     Financial Resource Strain: Not on file   Food Insecurity: No Food Insecurity (2024)    Hunger Vital Sign     Worried About Running Out of Food in the Last Year: Never true     Ran Out of Food in the Last Year: Never true   Transportation Needs: No 
               [facility]  BEHAVIORAL HEALTH FOLLOW-UP NOTE       2024     Patient was seen and examined in person, Chart reviewed   Patient's case discussed with staff/team    Chief Complaint: Psychosis    Interim History:   Pt report feeling better  Less depressed  No AH or VH  Less disorganization of thoughts   Pt agreeable to take medication in shot form    Appetite:   [] Normal/Unchanged  [] Increased  [x] Decreased      Sleep:       [] Normal/Unchanged  [x] Fair       [] Poor              Energy:    [] Normal/Unchanged  [] Increased  [x] Decreased        SI [] Present  [x] Absent    HI  []Present  [x] Absent     Aggression:  [] yes  [x] no    Patient is [x] able  [] unable to CONTRACT FOR SAFETY     PAST MEDICAL/PSYCHIATRIC HISTORY:   History reviewed. No pertinent past medical history.    FAMILY/SOCIAL HISTORY:  History reviewed. No pertinent family history.  Social History     Socioeconomic History    Marital status: Single     Spouse name: Not on file    Number of children: Not on file    Years of education: Not on file    Highest education level: Not on file   Occupational History    Not on file   Tobacco Use    Smoking status: Former     Current packs/day: 0.00     Average packs/day: 2.0 packs/day for 20.0 years (40.0 ttl pk-yrs)     Types: Cigarettes     Start date: 1998     Quit date: 2018     Years since quittin.5    Smokeless tobacco: Former   Vaping Use    Vaping status: Every Day   Substance and Sexual Activity    Alcohol use: Not Currently    Drug use: Not Currently    Sexual activity: Not on file   Other Topics Concern    Not on file   Social History Narrative    Not on file     Social Determinants of Health     Financial Resource Strain: Not on file   Food Insecurity: No Food Insecurity (2024)    Hunger Vital Sign     Worried About Running Out of Food in the Last Year: Never true     Ran Out of Food in the Last Year: Never true   Transportation Needs: No 
      Behavioral Services  Medicare Certification Upon Admission    I certify that this patient's inpatient psychiatric hospital admission is medically necessary for:    [x] (1) Treatment which could reasonably be expected to improve this patient's condition,       [x] (2) Or for diagnostic study;     AND     [x](2) The inpatient psychiatric services are provided while the individual is under the care of a physician and are included in the individualized plan of care.    Estimated length of stay/service 3-5    Plan for post-hospital care OP care    Electronically signed by ENOCH KILPATRICK MD on 11/19/2024 at 9:27 AM      
CLINICAL PHARMACY NOTE: MEDS TO BEDS    Total # of Prescriptions Filled: 2   The following medications were delivered to the patient:  Divalproex Dr 500 mg Tab  Paliperidone Er 6 mg Tab    Additional Documentation:    
Despite encouragement did not attend 1630 recreational group,  
Discharge instructions given verbally and in writing. All questions addressed. Patient  stated understanding of instructions. Personal belongings listed was stated by patient to be correct and pt signed as such. All personal belongings were returned as patient exited the unit.    Patient was escorted to taxi for transportation home.   Patient was offered but declined flu vaccine.  
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  
Explained and gave am invega, pt denied questions, denied pain or extra anxiety, groups were encouraged,  
Explained and gave newly ordered invega 234mg pt wanted in the rt delt. Pt earlene well w/o complaints ,denied questions,   
Patient refused his morning Depakote  
Patient's nurse notified of inappropriate statements patient made towards a peer (AB) after another patient (GUSTAVO) confided in this writer.   
Pt agreed to admission assessment. Pt is alert to self,, month,year. Pt has difficulty focusing on questions asked by staff, disorganized,tangential. Pt denies SI or SA. Pt denied A/V/T hallucinations,then stated,''95 people  in my house.I hear them. When Im lying on my bed,I feel them on my bed. I kicked them off and yelled at them.'' This writer questioned pt how he sleeps at night,pt stated,'' I think 8 hr. I have difficulty sleeping,its stressful and irritating.'' Pt endorses an increased appetite. Pt states wears glasses,thou left them at home. Pt had good eye contact and agreeable to deyrel 50mg po for sleep at 2232.  Unit paperwork with explanation of HIPAA Code and Pt Rights given. Per Catherine العلي in ER pt had her give mom and bro his HIPAA Code.   
Pt arrived on unit at 2214 via wheelchair. This writer and Catherine العلي completed skin check, small red raised areas,rash-vs- bites noted on upper left chest and abdomen. Negative for contraband. Tour of unit and room given.   
Pt is noted to be visible on the unit, with several periods of found to be napping, after 2 pm Depakote was given, pt stated Im not taking this any more, pt states he feels to sleepy, pt was reminded the sleepiness should go away, pt stated he was having brain surgery today, and reports he has had a chip in his neck since age 14. pt denied all depression anxiety suicidal thoughts voices, was seen to be internal stimulated, pt stays to him self, nonsocial.   
Pt mom Avelina stated pt can not go home with his brother related to him being on a lease and he can not have any one living with him. Contact info [phone].  
Pt talked about getting the chip out of his head, reminded pt animals may have chips, but not humans and pt laughed, stating somehow his mom did it at age 14, pt stated med change has helped him that he is not as sleepy.  
Pt's mom called and asked \"are you going to put him in a group home? Because he was verbally threatening the neighbors and the neighbor across the street keeps staring over here.\" This RN informed mom that pt requesting to live with his brother. Mom states, \"Oh my gosh. I don't even know if that's an option. I'll have to get back to you on that.\"  
Pt's mom called to reiterate that she is worried about pt coming home. She would like him to be placed in a group home. States the neighbor across the street keeps making statements to them and telling family she is going to press charges against him. Mom states pt will throw away meds when he gets home and thinks he should be somewhere where they can manage his medications   
Report per Catherine العلي ,44M patient with a history of schizophrenia and bipolar disorder presented to ER after contacting the police from home when neighbors confronted him about some of his actions. Patient has no SI, no HI. Patient is restless and hyperactive. Patient is guarded, suspicious, with rapid and pressured speech. Thoughts are disorganized, tangential, and patient jumps from topic to topic with no apparent association between topics. Patient is exhibiting A/V hallucinations and paranoid delusions. Patient reports \"hearing lots of voices through his telephone.\" He says he saw \"neighbors breaking into the house two weeks ago, but no one else knows but me.\" Patient reports \"having a seal on his forehead that disappeared\" and states \"I need to get my head taken care of.\" Patient states \"I have a different heart than anyone else.\" Patient reports that the house that he lives in is \"wired for the past 95 years\" and states \"I know the whole house is bugged.\" Patient states \"I even cleaned out the  drain and I should have told the [facility] about it.\" Patient has difficulty responding to many questions due to his preoccupations/delusions.   Patient was briefly connected to the [facility], but [facility] states patient was discharged from services in early 2024. Prior to DC, patient had been receiving both psychiatry and counseling services. Patient does not have a current mental health provider. Family reports patient stopped taking his medications at least a week ago, but possibly longer. Family reports that when patient consistently takes his medications, \"he acts normal.\" Patient/family do not know what medications patient is supposed to be on. TOXIC-,ETOH-. Pt being admitted per Dr Recinos,Dx Bipolar D.O./Paranoid Delusions/A/V Hallucinations. Emergency admit.  
Insecurity: No Food Insecurity (11/19/2024)    Hunger Vital Sign     Worried About Running Out of Food in the Last Year: Never true     Ran Out of Food in the Last Year: Never true   Transportation Needs: No Transportation Needs (11/19/2024)    PRAPARE - Transportation     Lack of Transportation (Medical): No     Lack of Transportation (Non-Medical): No   Physical Activity: Not on file   Stress: Not on file   Social Connections: Not on file   Intimate Partner Violence: Not on file   Housing Stability: Unknown (11/19/2024)    Housing Stability Vital Sign     Unable to Pay for Housing in the Last Year: No     Number of Times Moved in the Last Year: Not on file     Homeless in the Last Year: No           ROS:  [x] All negative/unchanged except if checked. Explain positive(checked items) below:  [] Constitutional  [] Eyes  [] Ear/Nose/Mouth/Throat  [] Respiratory  [] CV  [] GI  []   [] Musculoskeletal  [] Skin/Breast  [] Neurological  [] Endocrine  [] Heme/Lymph  [] Allergic/Immunologic    Explanation:     MEDICATIONS:    Current Facility-Administered Medications:     divalproex (DEPAKOTE) DR tablet 500 mg, 500 mg, Oral, 2 times per day, Nikki Enriquez APRN - CNP    paliperidone (INVEGA) extended release tablet 12 mg, 12 mg, Oral, Daily, Nikki Enriquez APRN - CNP, 12 mg at 11/25/24 0934    hydrOXYzine pamoate (VISTARIL) capsule 50 mg, 50 mg, Oral, Q6H PRN **OR** hydrOXYzine (VISTARIL) injection 50 mg, 50 mg, IntraMUSCular, Q6H PRN, Zeb Recinos MD    acetaminophen (TYLENOL) tablet 650 mg, 650 mg, Oral, Q4H PRN, Zeb Recinos MD    magnesium hydroxide (MILK OF MAGNESIA) 400 MG/5ML suspension 30 mL, 30 mL, Oral, Daily PRN, Zeb Recinos MD    aluminum & magnesium hydroxide-simethicone (MAALOX) 200-200-20 MG/5ML suspension 30 mL, 30 mL, Oral, PRN, Zeb Recinos MD    haloperidol (HALDOL) tablet 5 mg, 5 mg, Oral, Q6H PRN **OR** haloperidol lactate (HALDOL) injection 5 mg, 5 mg, IntraMUSCular, Q6H PRN, 
Nightly PRN, Zeb Recinos MD, 50 mg at 11/20/24 2100    nicotine polacrilex (COMMIT) lozenge 2 mg, 2 mg, Oral, Q1H PRN, Zeb Recinos MD      Examination:  BP (!) 142/91   Pulse 74   Temp 97.5 °F (36.4 °C)   Resp 18   Ht 1.905 m (6' 3\")   Wt 85.9 kg (189 lb 6 oz)   SpO2 99%   BMI 23.67 kg/m²   Gait - steady  Medication side effects(SE): no    Mental Status Examination:    Level of consciousness:  within normal limits   Appearance:  fair grooming and fair hygiene  Behavior/Motor:  psychomotor retardation  Attitude toward examiner:  cooperative  Speech:  slow   Mood: depressed  Affect:  anxious  Thought processes:  illogical   Thought content:  Delusions:  paranoid  Perceptual Disturbance:  auditory  Cognition:  oriented to person, place, and time   Concentration poor  Insight poor   Judgement poor     ASSESSMENT:   Patient symptoms are:  [] Well controlled  [] Improving  [] Worsening  [] No change      Diagnosis:   Schizoaffective disorder bipolar     LABS:    Recent Labs     11/18/24 1909   WBC 9.1   HGB 14.7        Recent Labs     11/18/24 1909      K 4.5      CO2 30   BUN 20   CREATININE 1.10   GLUCOSE 83     Recent Labs     11/18/24 1909   BILITOT <0.2   ALKPHOS 71   AST 15   ALT 10     Lab Results   Component Value Date/Time    BARBSCNU Neg 11/18/2024 06:57 PM    LABBENZ Neg 11/18/2024 06:57 PM    LABMETH Neg 11/18/2024 06:57 PM    ETOH <10 11/18/2024 07:09 PM     Lab Results   Component Value Date/Time    TSH 1.570 11/18/2024 07:09 PM     No results found for: \"LITHIUM\"  No results found for: \"VALPROATE\", \"CBMZ\"    RISK ASSESSMENT:     Treatment Plan:  Reviewed current Medications with the patient.   VPA level tomorrow  Increase Invega to 9 mg  Risks, benefits, side effects, drug-to-drug interactions and alternatives to treatment were discussed.  Collateral information:   CD evaluation  Encourage patient to attend group and other milieu activities.  Discharge planning 
activities.  Discharge planning discussed with the patient and treatment team.    PSYCHOTHERAPY/COUNSELING:  [x] Therapeutic interview  [x] Supportive  [] CBT  [] Ongoing  [] Other    [x] Patient continues to need, on a daily basis, active treatment furnished directly by or requiring the supervision of inpatient psychiatric personnel      Anticipated Length of stay:        COSIGN :    Electronically signed by ENOCH KILPATRICK MD on 11/22/2024 at 1:01 PM

## 2024-11-27 NOTE — TRANSITION OF CARE
Behavioral Health Transition Record    Patient Name: Raúl Fisher  YOB: 1980   Medical Record Number: [id number]  Date of Admission: 11/18/2024  6:40 PM   Date of Discharge: 11/27/2024    Attending Provider: Oskar Marie MD   Discharging Provider: Oskar Marie MD   To contact this individual call [phone].   A Behavioral Health Provider will be available on call 24/7 and during holidays.    Primary Care Provider: Yazmin Romero APRN - NP    Allergies   Allergen Reactions    Molds & Smuts Shortness Of Breath    Danazol     Penicillins      rash    Pollen Extract        Reason for Admission: eport lupe Alexander RN ,44M patient with a history of schizophrenia and bipolar disorder presented to ER after contacting the police from home when neighbors confronted him about some of his actions. Patient has no SI, no HI. Patient is restless and hyperactive. Patient is guarded, suspicious, with rapid and pressured speech. Thoughts are disorganized, tangential, and patient jumps from topic to topic with no apparent association between topics. Patient is exhibiting A/V hallucinations and paranoid delusions. Patient reports \"hearing lots of voices through his telephone.\" He says he saw \"neighbors breaking into the house two weeks ago, but no one else knows but me.\" Patient reports \"having a seal on his forehead that disappeared\" and states \"I need to get my head taken care of.\" Patient states \"I have a different heart than anyone else.\" Patient reports that the house that he lives in is \"wired for the past 95 years\" and states \"I know the whole house is bugged.\" Patient states \"I even cleaned out the  drain and I should have told the fire daniel about it.\" Patient has difficulty responding to many questions due to his preoccupations/delusions.    Patient was briefly connected to the [facility], but [facility] states patient was discharged from services in early 2024. Prior to DC, patient had

## 2024-11-27 NOTE — PLAN OF CARE
Problem: Risk for Elopement  Goal: Patient will not exit the unit/facility without proper excort  11/21/2024 2350 by Jodee Oreilly RN  Outcome: Progressing  11/21/2024 1303 by Rina Victoria RN  Outcome: Progressing     Problem: Anxiety  Goal: Will report anxiety at manageable levels  11/21/2024 2350 by Jodee Oreilly RN  Outcome: Progressing  11/21/2024 1303 by Rina Victoria RN  Outcome: Progressing     Problem: Coping  Goal: Pt/Family able to verbalize concerns and demonstrate effective coping strategies  11/21/2024 2350 by Jodee Oreilly RN  Outcome: Progressing  11/21/2024 1303 by Rina Victoria RN  Outcome: Progressing     Problem: Confusion  Goal: Confusion, delirium, dementia, or psychosis is improved or at baseline  11/21/2024 2350 by Jodee Oreilly RN  Outcome: Progressing  11/21/2024 1303 by Rina Victoria RN  Outcome: Progressing     Problem: Discharge Planning  Goal: Discharge to home or other facility with appropriate resources  11/21/2024 2350 by Jodee Oreilly RN  Outcome: Progressing  11/21/2024 1303 by Rina Victoria RN  Outcome: Progressing     
  Problem: Risk for Elopement  Goal: Patient will not exit the unit/facility without proper excort  11/22/2024 2332 by Jodee Oreilly RN  Outcome: Progressing  11/22/2024 1103 by Rina Victoria RN  Outcome: Progressing     Problem: Anxiety  Goal: Will report anxiety at manageable levels  11/22/2024 2332 by Jodee Oreilly RN  Outcome: Progressing  11/22/2024 1103 by Rina Victoria RN  Outcome: Progressing     Problem: Coping  Goal: Pt/Family able to verbalize concerns and demonstrate effective coping strategies  11/22/2024 2332 by Jodee Oreilly RN  Outcome: Progressing  11/22/2024 1103 by Rina Victoria RN  Outcome: Progressing     Problem: Confusion  Goal: Confusion, delirium, dementia, or psychosis is improved or at baseline  11/22/2024 2332 by Jodee Oreilly RN  Outcome: Progressing  11/22/2024 1103 by Rina Victoria RN  Outcome: Progressing     Problem: Discharge Planning  Goal: Discharge to home or other facility with appropriate resources  11/22/2024 2332 by Jodee Oreilly RN  Outcome: Progressing  11/22/2024 1103 by Rina Victoria RN  Outcome: Progressing     
  Problem: Risk for Elopement  Goal: Patient will not exit the unit/facility without proper excort  11/27/2024 0008 by Jodee Oreilly RN  Outcome: Progressing  11/26/2024 1523 by Raven Landaverde RN  Outcome: Progressing     Problem: Anxiety  Goal: Will report anxiety at manageable levels  11/27/2024 0008 by Jodee Oreilly RN  Outcome: Progressing  11/26/2024 1523 by Raven Landaverde RN  Outcome: Progressing     Problem: Coping  Goal: Pt/Family able to verbalize concerns and demonstrate effective coping strategies  11/27/2024 0008 by Jodee Oreilly RN  Outcome: Progressing  11/26/2024 1523 by Raven Landaverde RN  Outcome: Progressing     Problem: Confusion  Goal: Confusion, delirium, dementia, or psychosis is improved or at baseline  11/27/2024 0008 by Jodee Oreilly RN  Outcome: Progressing  11/26/2024 1523 by Raven Landaverde RN  Outcome: Progressing     Problem: Discharge Planning  Goal: Discharge to home or other facility with appropriate resources  11/27/2024 0008 by Jodee Oreilly RN  Outcome: Progressing  11/26/2024 1523 by Raven Landaverde RN  Outcome: Progressing     Problem: Pain  Goal: Verbalizes/displays adequate comfort level or baseline comfort level  11/27/2024 0008 by Jodee Oreilly RN  Outcome: Progressing  11/26/2024 1523 by Raven Landaverde RN  Outcome: Progressing     
Assumed care of patient at 07:30. Patient has been intermittently visible, however is seclusive to self. Brief and guarded during mental health assessment. Patient denies suicidal/homicidal ideations, hallucinations, depression, and anxiety. Reports adequate sleep and appetite. Medication compliant, no PRNs this shift. Plan of care reviewed and ongoing.     Problem: Risk for Elopement  Goal: Patient will not exit the unit/facility without proper excort  Outcome: Progressing     Problem: Anxiety  Goal: Will report anxiety at manageable levels  Description: INTERVENTIONS:  1. Administer medication as ordered  2. Teach and rehearse alternative coping skills  3. Provide emotional support with 1:1 interaction with staff  Outcome: Progressing     Problem: Coping  Goal: Pt/Family able to verbalize concerns and demonstrate effective coping strategies  Description: INTERVENTIONS:  1. Assist patient/family to identify coping skills, available support systems and cultural and spiritual values  2. Provide emotional support, including active listening and acknowledgement of concerns of patient and caregivers  3. Reduce environmental stimuli, as able  4. Instruct patient/family in relaxation techniques, as appropriate  5. Assess for spiritual pain/suffering and initiate Spiritual Care, Psychosocial Clinical Specialist consults as needed  Outcome: Progressing     Problem: Confusion  Goal: Confusion, delirium, dementia, or psychosis is improved or at baseline  Description: INTERVENTIONS:  1. Assess for possible contributors to thought disturbance, including medications, impaired vision or hearing, underlying metabolic abnormalities, dehydration, psychiatric diagnoses, and notify attending LIP  2. [place] high risk fall precautions, as indicated  3. Provide frequent short contacts to provide reality reorientation, refocusing and direction  4. Decrease environmental stimuli, including noise as appropriate  5. Monitor and intervene 
Assumed care of patient at 07:30. Patient has been visible and social on the unit. Not attending mental health groups, despite encouragement. Patient showered this morning independently. Patient is agreeable to discuss mental health symptoms, and denies all suicidal/homicidal ideations, hallucinations, depression, and anxiety. Patient is fixated that he needs a new DR because he is prescribed \"Seizure medication and I don't have seizures.\" Patient presents paranoid of medications and is refusing depakote, has not utilized PRN medication. Patient reports he slept \"OK\". Calm on unit, no distress noted. Plan of care reviewed and ongoing.         Problem: Risk for Elopement  Goal: Patient will not exit the unit/facility without proper excort  11/23/2024 0804 by Chiqui Roland RN  Outcome: Progressing  11/22/2024 2332 by Jodee Oreilly RN  Outcome: Progressing     Problem: Anxiety  Goal: Will report anxiety at manageable levels  Description: INTERVENTIONS:  1. Administer medication as ordered  2. Teach and rehearse alternative coping skills  3. Provide emotional support with 1:1 interaction with staff  11/23/2024 0804 by Chiqui Roland RN  Outcome: Progressing  11/22/2024 2332 by Jodee Oreilly RN  Outcome: Progressing     Problem: Coping  Goal: Pt/Family able to verbalize concerns and demonstrate effective coping strategies  Description: INTERVENTIONS:  1. Assist patient/family to identify coping skills, available support systems and cultural and spiritual values  2. Provide emotional support, including active listening and acknowledgement of concerns of patient and caregivers  3. Reduce environmental stimuli, as able  4. Instruct patient/family in relaxation techniques, as appropriate  5. Assess for spiritual pain/suffering and initiate Spiritual Care, Psychosocial Clinical Specialist consults as needed  11/23/2024 0804 by Chiqui Roland RN  Outcome: Progressing  11/22/2024 2332 by Jodee Oreilly RN  Outcome: 
Pt has labile mood. Irritable at times and preoccupied with D/C. Pt states, \"The doctor is taking too long to fix my head.\" When asked what was wrong with his head pt responds, \"I was in my brothers room and there was a heater and it was so hot that now there is no water or blood left in my body.\" Pt continues to be disorganized. Denies anxiety and depression. Denies SI/HI/AVH. Pt appears internally stimulated at times. Reports both sleep and appetite are just okay and could improve. Pt signed voluntary admission form.     Problem: Risk for Elopement  Goal: Patient will not exit the unit/facility without proper excort  11/21/2024 1303 by Rian Victoria RN  Outcome: Progressing  Flowsheets (Taken 11/21/2024 1246)  Nursing Interventions for Elopement Risk: Reduce environmental triggers  11/20/2024 2340 by Nasrin Gibbons RN  Outcome: Progressing  Flowsheets (Taken 11/20/2024 0946 by Beti Curry RN)  Nursing Interventions for Elopement Risk:   Reduce environmental triggers   Make sure patient has all necessary personal care items     Problem: Anxiety  Goal: Will report anxiety at manageable levels  Description: INTERVENTIONS:  1. Administer medication as ordered  2. Teach and rehearse alternative coping skills  3. Provide emotional support with 1:1 interaction with staff  11/21/2024 1303 by Rina Victoria RN  Outcome: Progressing  Flowsheets (Taken 11/21/2024 1246)  Will report anxiety at manageable levels:   Provide emotional support with 1:1 interaction with staff   Administer medication as ordered   Teach and rehearse alternative coping skills  11/20/2024 2340 by Nasrin Gibbons RN  Outcome: Progressing  Flowsheets (Taken 11/20/2024 0946 by Beti Curry RN)  Will report anxiety at manageable levels:   Administer medication as ordered   Teach and rehearse alternative coping skills   Provide emotional support with 1:1 interaction with staff     Problem: Coping  Goal: Pt/Family able to verbalize 
Visible on the unit, social with peers. Walks with strong and steady gait. Denies SI, HI, AVH. Appears internally stimulated at times. During assessment pt intermittently looks away and asks this RN to repeat questions that he didn't hear. Pt is tangential and his train of thought is hard to follow during conversation. Spoke about his love for fishing, the lake and being out in nature. Spoke about his mother and their family dynamics. Reports that his anxiety and depression are better now that he has started medication and is drinking coffee. Reports good sleep and appetite. Encouraged to perform daily hygiene care and to attend groups. Pt signed his consents but frequently became distracted and required prompting. Currently awake sitting in the dayroom.       Problem: Risk for Elopement  Goal: Patient will not exit the unit/facility without proper excort  Outcome: Progressing     Problem: Anxiety  Goal: Will report anxiety at manageable levels  Description: INTERVENTIONS:  1. Administer medication as ordered  2. Teach and rehearse alternative coping skills  3. Provide emotional support with 1:1 interaction with staff  Outcome: Progressing     Problem: Coping  Goal: Pt/Family able to verbalize concerns and demonstrate effective coping strategies  Description: INTERVENTIONS:  1. Assist patient/family to identify coping skills, available support systems and cultural and spiritual values  2. Provide emotional support, including active listening and acknowledgement of concerns of patient and caregivers  3. Reduce environmental stimuli, as able  4. Instruct patient/family in relaxation techniques, as appropriate  5. Assess for spiritual pain/suffering and initiate Spiritual Care, Psychosocial Clinical Specialist consults as needed  Outcome: Progressing     Problem: Confusion  Goal: Confusion, delirium, dementia, or psychosis is improved or at baseline  Description: INTERVENTIONS:  1. Assess for possible contributors to 
Visible on the unit, social with staff and peers. Walks with strong and steady gait. Denies SI, HI, VH. Appears internally stimulated at times. Endorses AH- \"here and there.\" Preoccupied with wanting an MRI \"to check my brain waves.\" Believes that his mother also needs an MRI. Continues to voice paranoid delusions. Good sleep and appetite. Encouraged to perform daily hygiene care and to attend groups.         Problem: Risk for Elopement  Goal: Patient will not exit the unit/facility without proper excort  Outcome: Progressing     Problem: Anxiety  Goal: Will report anxiety at manageable levels  Description: INTERVENTIONS:  1. Administer medication as ordered  2. Teach and rehearse alternative coping skills  3. Provide emotional support with 1:1 interaction with staff  Outcome: Progressing     Problem: Coping  Goal: Pt/Family able to verbalize concerns and demonstrate effective coping strategies  Description: INTERVENTIONS:  1. Assist patient/family to identify coping skills, available support systems and cultural and spiritual values  2. Provide emotional support, including active listening and acknowledgement of concerns of patient and caregivers  3. Reduce environmental stimuli, as able  4. Instruct patient/family in relaxation techniques, as appropriate  5. Assess for spiritual pain/suffering and initiate Spiritual Care, Psychosocial Clinical Specialist consults as needed  Outcome: Progressing     Problem: Confusion  Goal: Confusion, delirium, dementia, or psychosis is improved or at baseline  Description: INTERVENTIONS:  1. Assess for possible contributors to thought disturbance, including medications, impaired vision or hearing, underlying metabolic abnormalities, dehydration, psychiatric diagnoses, and notify attending LIP  2. [place] high risk fall precautions, as indicated  3. Provide frequent short contacts to provide reality reorientation, refocusing and direction  4. Decrease environmental stimuli, 
Reduce environmental stimuli, as able   Instruct patient/family in relaxation techniques, as appropriate   Assess for spiritual pain/suffering and initiate Spiritual Care, Psychosocial Clinical Specialist consults as needed  11/21/2024 2350 by Jodee Oreilly RN  Outcome: Progressing     Problem: Confusion  Goal: Confusion, delirium, dementia, or psychosis is improved or at baseline  Description: INTERVENTIONS:  1. Assess for possible contributors to thought disturbance, including medications, impaired vision or hearing, underlying metabolic abnormalities, dehydration, psychiatric diagnoses, and notify attending LIP  2. [place] high risk fall precautions, as indicated  3. Provide frequent short contacts to provide reality reorientation, refocusing and direction  4. Decrease environmental stimuli, including noise as appropriate  5. Monitor and intervene to maintain adequate nutrition, hydration, elimination, sleep and activity  6. If unable to ensure safety without constant attention obtain sitter and review sitter guidelines with assigned personnel  7. Initiate Psychosocial CNS and Spiritual Care consult, as indicated  11/22/2024 1103 by Rina Victoria, RN  Outcome: Progressing  11/21/2024 2350 by Jodee Oreilly RN  Outcome: Progressing     Problem: Discharge Planning  Goal: Discharge to home or other facility with appropriate resources  11/22/2024 1103 by Rina Victoria, RN  Outcome: Progressing  11/21/2024 2350 by Jodee Oreilly RN  Outcome: Progressing     
as able  4. Instruct patient/family in relaxation techniques, as appropriate  5. Assess for spiritual pain/suffering and initiate Spiritual Care, Psychosocial Clinical Specialist consults as needed  11/25/2024 1211 by Rina Victoria RN  Outcome: Progressing  11/25/2024 0142 by Luis Eduardo Alvarado, RN  Outcome: Progressing     Problem: Confusion  Goal: Confusion, delirium, dementia, or psychosis is improved or at baseline  Description: INTERVENTIONS:  1. Assess for possible contributors to thought disturbance, including medications, impaired vision or hearing, underlying metabolic abnormalities, dehydration, psychiatric diagnoses, and notify attending LIP  2. [place] high risk fall precautions, as indicated  3. Provide frequent short contacts to provide reality reorientation, refocusing and direction  4. Decrease environmental stimuli, including noise as appropriate  5. Monitor and intervene to maintain adequate nutrition, hydration, elimination, sleep and activity  6. If unable to ensure safety without constant attention obtain sitter and review sitter guidelines with assigned personnel  7. Initiate Psychosocial CNS and Spiritual Care consult, as indicated  11/25/2024 1211 by Rina Victoria, RN  Outcome: Progressing  11/25/2024 0142 by Luis Eduardo Alvarado, RN  Outcome: Progressing     Problem: Discharge Planning  Goal: Discharge to home or other facility with appropriate resources  11/25/2024 1211 by Rina Victoria RN  Outcome: Progressing  11/25/2024 0142 by Luis Eduardo Alvarado, RN  Outcome: Progressing     
for spiritual pain/suffering and initiate Spiritual Care, Psychosocial Clinical Specialist consults as needed  11/20/2024 0837 by Beti Curry RN  Outcome: Progressing  11/19/2024 2124 by Nasrin Gibbons RN  Outcome: Progressing  Flowsheets (Taken 11/19/2024 1445 by Raven RN)  Patient/family able to verbalize anxieties, fears, and concerns, and demonstrate effective coping:   Reduce environmental stimuli, as able   Provide emotional support, including active listening and acknowledgement of concerns of patient and caregivers     Problem: Confusion  Goal: Confusion, delirium, dementia, or psychosis is improved or at baseline  Description: INTERVENTIONS:  1. Assess for possible contributors to thought disturbance, including medications, impaired vision or hearing, underlying metabolic abnormalities, dehydration, psychiatric diagnoses, and notify attending LIP  2. [place] high risk fall precautions, as indicated  3. Provide frequent short contacts to provide reality reorientation, refocusing and direction  4. Decrease environmental stimuli, including noise as appropriate  5. Monitor and intervene to maintain adequate nutrition, hydration, elimination, sleep and activity  6. If unable to ensure safety without constant attention obtain sitter and review sitter guidelines with assigned personnel  7. Initiate Psychosocial CNS and Spiritual Care consult, as indicated  11/20/2024 0837 by Beti Curry RN  Outcome: Progressing  11/19/2024 2124 by Nasrin Gibbons RN  Outcome: Progressing  Flowsheets (Taken 11/19/2024 1445 by Raven RN)  Effect of thought disturbance (confusion, delirium, dementia, or psychosis) are managed with adequate functional status:   Decrease environmental stimuli, including noise as appropriate   Provide frequent short contacts to provide reality reorientation, refocusing and direction     Problem: Discharge Planning  Goal: Discharge to home or other facility with appropriate

## 2024-11-27 NOTE — GROUP NOTE
Group Therapy Note    Date: 11/19/2024    Group Start Time: 1215  Group End Time: 1310  Group Topic: Nursing    ML 3W Merle Ignacio RN        Group Therapy Note    Attendees: 6/15       Patient's Goal: Learning about the importance/benefits  of self care.    Status After Intervention:  Unchanged    Participation Level: Active Listener    Participation Quality: Appropriate      Speech:  normal      Thought Process/Content: Logical      Affective Functioning: Congruent      Mood: euthymic      Level of consciousness:  Alert      Response to Learning: Able to verbalize current knowledge/experience      Endings: None Reported    Modes of Intervention: Education, Support, and Socialization      Discipline Responsible: Registered Nurse      Signature:  Merle Hughes RN    
                                                                      Group Therapy Note    Date: 11/19/2024    Group Start Time: 1430  Group End Time: 1520  Group Topic:  Group    [facility] Kulwinder Easton LSW        Group Therapy Note    Attendees: 4       Patient's Goal:  Open Forum     Notes:  Client participated in group with Sunita Kirkland of [facility]. Group topic was open forum about discharge goals. Discussion of the Serenity Prayer, and Psalms 23.     Status After Intervention:  Improved    Participation Level: Interactive    Participation Quality: Appropriate      Speech:  normal      Thought Process/Content: Logical      Affective Functioning: Congruent      Mood: euthymic      Level of consciousness:  Alert      Response to Learning: Able to verbalize current knowledge/experience      Endings: None Reported    Modes of Intervention: Education      Discipline Responsible: /Counselor      Signature:  GUILHERME Fagan    
                                                                      Group Therapy Note    Date: 11/20/2024    Group Start Time: 1010  Group End Time: 1100  Group Topic: Art Therapy     [facility] 3W Alaina Segal, LISW        Group Therapy Note    Attendees: 4       Patient's Goal:  To participate in morning group art therapy.     Notes:  Patient attended the morning group art therapy session. Instead of participating in art making, patient observed others doing so. He made some odd statements that his peers did not respond to at all. Patient seemed to benefit being around others.     Status After Intervention:  Unchanged    Participation Level: Interactive    Participation Quality: Intrusive      Speech:  loud      Thought Process/Content: Flight of ideas      Affective Functioning: Incongruent      Mood: elevated      Level of consciousness:  Attentive      Response to Learning: Progressing to goal      Endings: None Reported    Modes of Intervention: Activity      Discipline Responsible: Psychoeducational Specialist      Signature:  Alaina Ocasio MA ATR LPAT    
                                                                      Group Therapy Note    Date: 11/20/2024    Group Start Time: 2000  Group End Time: 2030  Group Topic: Recreational    [facility] 3W Qing Galeano        Group Therapy Note    Attendees: 7/17       Patient's Goal:  Pt was able to socialize while playing the wii        Status After Intervention:  Unchanged    Participation Level: Interactive    Participation Quality: Attentive      Speech:  normal      Thought Process/Content: Logical      Affective Functioning: Congruent      Mood: euthymic      Level of consciousness:  Attentive      Response to Learning: Able to verbalize current knowledge/experience      Endings: None Reported    Modes of Intervention: Activity      Discipline Responsible: PCA      Signature:  Qing Dempsey    
                                                                      Group Therapy Note    Date: 11/21/2024    Group Start Time: 1250  Group End Time: 1340  Group Topic:  Group    [facility]    Kulwinder Mccorm[facility] LSW        Group Therapy Note    Attendees: 11       Patient's Goal:  Inner Strengths     Notes:  Client participated in a group process of personal inner strengths. Client was able to name at least three (3) inner strengths and share with peers. Client participated in making a week challenge of implementation of these strengths upon discharge. Client was supportive of peers during group process.     Status After Intervention:  Improved    Participation Level: Interactive    Participation Quality: Appropriate      Speech:  normal      Thought Process/Content: Logical      Affective Functioning: Congruent      Mood: euthymic      Level of consciousness:  Oriented x4      Response to Learning: Able to verbalize current knowledge/experience      Endings: None Reported    Modes of Intervention: Education      Discipline Responsible: /Counselor      Signature:  GUILHERME Fagan    
                                                                      Group Therapy Note    Date: 11/21/2024    Group Start Time: 2000  Group End Time: 2040  Group Topic: Wrap-Up    [facility] 3W Michael Stovall        Group Therapy Note    Attendees: 11/21     Notes:  Pt attended tonight's wrap up group.     Discipline Responsible: [employer] Tech      Signature:  Michael Montano    
                                                                      Group Therapy Note    Date: 11/23/2024    Group Start Time: 1118  Group End Time: 1218  Group Topic: Activity    MLO[facility] [address]W [address]        Group Therapy Note    Attendees: 16       Patient's Goal:  To attend group    Notes:  Pt was attentive     Status After Intervention:  Unchanged    Participation Level: Interactive    Participation Quality: Appropriate      Speech:  normal      Thought Process/Content: Logical      Affective Functioning: Congruent      Mood: euthymic      Level of consciousness:  Alert      Response to Learning: Able to verbalize current knowledge/experience      Endings: None Reported    Modes of Intervention: Activity      Discipline Responsible: Behavorial Health Tech      Signature:  Virgie Ferguson    
                                                                      Group Therapy Note    Date: 11/23/2024    Group Start Time: 1643  Group End Time: 1708  Group Topic: Healthy Living/Wellness    ML[facility] 3W Vibha Pickens        Group Therapy Note    Attendees: 14/24       Patient's Goal:  To learn healthy coping skills and grounding techniques.    Notes:  Pt attended.    Status After Intervention:  Improved    Participation Level: Active Listener and Interactive    Participation Quality: Appropriate, Attentive, and Sharing      Speech:  normal      Thought Process/Content: Logical      Affective Functioning: Congruent      Mood: euthymic      Level of consciousness:  Alert and Attentive      Response to Learning: Progressing to goal      Endings: None Reported    Modes of Intervention: Education      Discipline Responsible: Behavorial Health Tech      Signature:  Vibha Goff    
                                                                      Group Therapy Note    Date: 11/23/2024    Group Start Time: 2045  Group End Time: 2105  Group Topic: Relaxation    [facility] 3W Vibha Pickens        Group Therapy Note    Attendees: 13/24       Patient's Goal:  To attend relaxation group and participate in guided meditation     Notes:  Pt attended.    Status After Intervention:  Improved    Participation Level: Active Listener and Interactive    Participation Quality: Appropriate, Attentive, and Sharing      Speech:  normal      Thought Process/Content: Logical      Affective Functioning: Congruent      Mood: euthymic      Level of consciousness:  Alert and Attentive      Response to Learning: Progressing to goal      Endings: None Reported    Modes of Intervention: Support      Discipline Responsible: Behavorial Health Tech      Signature:  Vibha Goff    
                                                                      Group Therapy Note    Date: 11/24/2024    Group Start Time: 1000  Group End Time: 1100  Group Topic: Art Therapy     R[facility] 3W Alaina Segal, ELLIEW        Group Therapy Note    Attendees: 12       Patient's Goal:  To participate in morning group art therapy.     Notes:  Patient did not attend.     Modes of Intervention: Activity      Discipline Responsible: Psychoeducational Specialist      Signature:  Alaina Ocasio MA ATR [facility]    
                                                                      Group Therapy Note    Date: 11/24/2024    Group Start Time: 1645  Group End Time: 1740  Group Topic: Recreational    [facility] 3W Michael Stovall        Group Therapy Note    Attendees: 15/25    Notes:  Pt attended today's group.     Discipline Responsible: Behavorial Health Tech      Signature:  Michael Montano    
                                                                      Group Therapy Note    Date: 11/24/2024    Group Start Time: 1950  Group End Time: 2035  Group Topic: Wrap-Up    ML[facility] 3W Michael Stovall        Group Therapy Note    Attendees: 10/25    Notes:  Pt attended tonight's wrap up group.    Discipline Responsible: [employer] Tech      Signature:  Michael Montano    
                                                                      Group Therapy Note    Date: 11/25/2024    Group Start Time: 2030  Group End Time: 2130  Group Topic: Recreational    [facility] 3W Qing Galeano        Group Therapy Note    Attendees: 5/19       Patient's Goal:  Pt was able to socialize while playing the wii        Status After Intervention:  Unchanged    Participation Level: Interactive    Participation Quality: Attentive      Speech:  normal      Thought Process/Content: Logical      Affective Functioning: Congruent      Mood: euthymic      Level of consciousness:  Attentive      Response to Learning: Able to verbalize current knowledge/experience      Endings: None Reported    Modes of Intervention: Activity      Discipline Responsible: PCA      Signature:  Qign Dempsey    
                                                                      Group Therapy Note    Date: 11/26/2024    Group Start Time: 1210  Group End Time: 1245  Group Topic: Nursing    [facility] 3W Merle Ignacio RN        Group Therapy Note    Attendees: 5/17       Patient's Goal:  Learning the benefits of forgiveness     Status After Intervention:  Improved    Participation Level: Active Listener    Participation Quality: Appropriate      Speech:  normal      Thought Process/Content: Logical      Affective Functioning: Congruent      Mood: euthymic      Level of consciousness:  Alert      Response to Learning: Able to verbalize current knowledge/experience      Endings: None Reported    Modes of Intervention: Education, Support, and Socialization      Discipline Responsible: Registered Nurse      Signature:  Merle Hughes RN    
                                                                      Group Therapy Note    Date: 11/26/2024    Group Start Time: 1300  Group End Time: 1400  Group Topic:  Group    [facility] Kulwinder Easton LSW        Group Therapy Note    Attendees: 8       Patient's Goal:  Emotional Regulation     Notes:  Client participated in group process of emotional identification and regulation. Client was able to identify several emotions felt as well as the ability to share that with group peers. Client was supportive of peers during group process. Client offered romantic thoughts about another group member and was redirected. (Client expressed love to Jenny) Client is aware of his inappropriate gesture and apologized     Status After Intervention:  Improved    Participation Level: Interactive    Participation Quality: Intrusive      Speech:  normal      Thought Process/Content: Logical      Affective Functioning: Congruent      Mood: anxious      Level of consciousness:  Oriented x4      Response to Learning: Able to verbalize current knowledge/experience      Endings: None Reported    Modes of Intervention: Education      Discipline Responsible: /Counselor      Signature:  GUILHERME Fagan    
                                                                      Group Therapy Note    Date: 11/26/2024    Group Start Time: 1510  Group End Time: 1540  Group Topic: Recreational    [facility] 3W Michael Stovall        Group Therapy Note    Attendees: 7/18      Notes:  Pt did not attend.     Discipline Responsible: Behavorial Health Tech      Signature:  Michael Montano    
                                                                      Group Therapy Note    Date: 11/26/2024    Group Start Time: 1945  Group End Time: 2035  Group Topic: Wrap-Up    [facility] 3W Michael Stovall        Group Therapy Note    Attendees: 11/21     Notes:  Pt attended tonight's wrap up group. Pt offered to give this staff member his phone number as he shared he is leaving tomorrow. This offer was firmly declined and boundaries were set about patient/staff communication off of the unit.     Discipline Responsible: Behavorial Health Tech      Signature:  Michael Montano    
                                                                      Group Therapy Note    Date: 11/27/2024    Group Start Time: 1010  Group End Time: 1100  Group Topic: Art Therapy     [facility] 3W Alaina Segal, ELLIEW        Group Therapy Note    Attendees: 10       Patient's Goal:  To participate in morning group art therapy.     Notes:  Patient attended briefly. He left the room with staff. Then returned when the session was nearly over.     Status After Intervention:  Unchanged    Participation Level: Minimal    Participation Quality: Inappropriate      Speech:  normal      Thought Process/Content: Logical      Affective Functioning: Congruent      Mood: elevated      Level of consciousness:  Alert      Response to Learning: Progressing to goal      Endings: None Reported    Modes of Intervention: Activity      Discipline Responsible: Psychoeducational Specialist      Signature:  Alaina Ocasio MA ATR LPAT    
Date: 11/19/2024    Group Start Time: 0915  Group End Time: 0945  Group Topic: Music Therapy    [facility] 3W Chandni Chow    \"I Can See Clearly Now\" Mamta Substitution  Active Music-Making, Composition, Improvisation, Live Music Listening, Songwriting     Patients will listen to/engage with live music (\"I Can See Clearly Now\" by Will Dickens) through active listening, moving, and/or singing. Patients will individually \"rewrite\" the song so it is more personalized to their experiences, and have the option to share/have LPMT recreate their mamta substitution. Patients will discuss the experience as a group.     Focus: Building Positive Experiences, Challenging Negative Self-Talk, Processing   Goals: Improve Self-Expression, Improve Self-Esteem, Increase Mood, Increase Socialization, Develop Coping Skills, Decrease Negative Self-Talk, Improve Self-Awareness / Insight, Improve Attention to Task, Increase Sensory Stimulation     Patient listened and smiled along to live music. Patient began his mamta substitution, but was pulled before completing it. Patient returned in time to hear peers' mamta substitutions being recreated.     Attended: 1/4-1/2 attendance  Participation Level: Active Listener  Participation Quality: Attentive and Sharing  Affect/Mood: Congruent/Euthymic  Speech: normal rate and normal volume   Thought Content/Processes: Vague  Level of consciousness: Alert  Response: Able to retain information  Outcomes: Initial interaction with patient    Signature: Chandni Saleh, Licensed Professional Music Therapist (LPMT)  
Date: 11/20/2024    Group Start Time: 0915  Group End Time: 0944  Group Topic: Community Meeting    [facility] Chandni Chow    \"Circles\" by Post Tavera  Mamta Analysis and Song Discussion    Patients will listen to \"Circles\" by [employer] Tavera and discuss lyrics/themes/interpretations derived from the song. Patients will identify negative patterns in their lives and discuss why it is difficult to break them. Patients will brainstorm ideas of how to break their negative patterns.     Focus: Negative Cycles/Patterns, Processing, Self-Awareness     Goals: Increase Community Building/Socialization, Improve Mood, Increase Future Thinking, Improve Coping Skills, Increase Relaxation, Improve Insight/Self-Awareness, Improve Self-Expression, Improve Attention to Task    Patient listened to recorded music and engaged in peer song discussions. Patient presented as disorganized and delusional AEB discussing how he has a \"chip in me since I was 14 years old,\" \"my house was bugged,\" and how \"everything is [place] is illegal and scam artists... look it up on Google.\" Patient verbalized support for peers' contributions. Patient demonstrated some insight into his presentation AEB recognizing that his \"brain feels scrambled... hard to think.\"     Attended: Full attendance  Participation Level: Interactive  Participation Quality: Sharing and Supportive  Affect/Mood: Congruent/Euthymic  Speech: spontaneous, normal rate, and normal volume   Thought Content/Processes: Loose or idiosyncratic associations, Vague, and Disorganized  Delusions/Hallucinations: paranoid and persecutory /    Level of consciousness: Alert  Response: Able to verbalize current knowledge/experience  Outcomes: Progressing towards goal and Actively participated in the group experience    Signature: Chandni Saleh, Licensed Professional Music Therapist (LPMT)  
Date: 11/20/2024    Group Start Time: 1305  Group End Time: 1410  Group Topic: Music Therapy    [facility] [place] [facility] Chandni Chow    Active Music Making, Live Music Listening, and Music Reminiscence  Patients will be given a songbook and offered the opportunity to select (a) song(s) of their choice for live music listening on hybris. Patients will be encouraged to sing along, move along, and listen to the music.    Focus: Building Positive Experiences, Processing, and Relaxation     Goals: Improve Mood, Decrease Isolation, Increase Socialization, Improve Self-Esteem, Improve Self-Expression, Provide Reality Orientation, Develop Coping Skills     Patient selected songs for live music listening and sang/moved to familiar music. Patient expressed that he enjoys listening to music because it helps him feel relaxed and helps with falling asleep. Patient socialized with peers/staff appropriately, voicing significantly less delusional thought content when compared to previous interactions. Patient provided support to a peer ([place]) who became tearful during live music. Patient shared that he enjoyed group today.     Attended: 3/4-full attendance  Participation Level: Active Listener and Interactive  Participation Quality: Attentive, Sharing, and Supportive  Affect/Mood: Congruent/Euthymic  Speech: spontaneous, normal rate, and normal volume   Thought Content/Processes: Tangential and Disorganized  Level of consciousness: Alert  Response: Able to verbalize current knowledge/experience  Outcomes: Successfully internalized the purpose of intervention and Actively participated in the group experience    Signature: Chandni Saleh, Licensed Professional Music Therapist (LPMT)  
Date: 11/21/2024    Group Start Time: 0910  Group End Time: 1020  Group Topic: Music Therapy    [facility] [place] 3W Chandni Chow    Motivation  Iso-Principle / Mood Vectoring, Receptive Music Listening    Patients will discuss what motivation means to them, and express what things in their lives motivate them. Patients will roll a die and have three songs to choose from corresponding to the rolled die. Patients will listen to the song and explore what makes the song \"motivational.\"     Focus: Emotion Regulation and Motivation    Goals: Improve/Maintain Attention to Task, Improve/Maintain Cognitive Skills, Improve/Maintain Insight / Self-Awareness, Increase/Maintain Level of Socialization, Improve Mood, Improve/Maintain Self-Esteem, Improve/Maintain Self-Expression, and Improve/Maintain Use of Coping Skills     Patient joined for the last quarter of group. Patient chose a song from provided options, and shared after listening that the song helped him \"open my heart.\" Patient verbalized support for peers' contributions and socialized appropriately.     Attended: 1/4-1/2 attendance  Participation Level: Interactive  Participation Quality: Sharing  Affect/Mood: Congruent/Euthymic  Speech: normal rate and normal volume   Thought Content/Processes: Vague  Level of consciousness: Alert  Response: Able to verbalize current knowledge/experience  Outcomes: Progressing towards goal and Actively participated in the group experience    Signature: Chandni Saleh Licensed Professional Music Therapist (LPMT)  
Date: 11/25/2024    Group Start Time: 1330  Group End Time: 1430  Group Topic: Music Therapy    [facility] [place] 3W Chandni Chow    Active Music Making, Live Music Listening, and Music Reminiscence  Patients will be given a songbook and offered the opportunity to select (a) song(s) of their choice for live music listening on [employer]. Patients will be encouraged to sing along, move along, and listen to the music.    Focus: Building Positive Experiences, Processing, and Relaxation     Goals: Improve Mood, Decrease Isolation, Increase Socialization, Improve Self-Esteem, Improve Self-Expression, Provide Reality Orientation, Develop Coping Skills     Patient selected songs for live music listening and moved to familiar music. Patient verbalized feeling \"much better\" this afternoon, and appropriately socialized with peers/staff. Patient expressed appreciation for group.     Attended: 3/4-full attendance  Participation Level: Active Listener and Interactive  Participation Quality: Attentive, Sharing, and Supportive  Affect/Mood: Congruent/Euthymic  Speech: spontaneous, normal rate, and normal volume   Thought Content/Processes: Linear  Level of consciousness: Alert  Response: Able to verbalize current knowledge/experience  Outcomes: Progressing towards goal and Actively participated in the group experience    Signature: Chandni Saleh, Licensed Professional Music Therapist (LPMT)  
Group Therapy Note    Date: 11/19/2024    Group Start Time: 1300  Group End Time: 1400  Group Topic:  Group    [facility]        Group Therapy Note  Patient's learned about forgiveness the and pros and cons of forgiveness. Patient's applied learning to their real life situations and the people they can forgive in their lives. Patient's interacted with each other and shared their stories.    Attendees: 6       Patient's Goal:  Patient quietly listened in group today.    Notes:  Pleasant.    Status After Intervention:  Improved    Participation Level: Active Listener    Participation Quality: Appropriate      Speech:  normal      Thought Process/Content: Logical      Affective Functioning: Congruent      Mood: depressed      Level of consciousness:  Alert      Response to Learning: Progressing to goal      Endings: None Reported    Modes of Intervention: Education, Support, and Socialization      Discipline Responsible: /Counselor      Signature:  GUILHERME Owens    
Patient did not attend group / Anticipated discharge today.    Date: 11/27/2024    Group Start Time: 0915  Group End Time: 0945  Group Topic: Community Meeting    [facility] [place] 3W Chandni Chow    Music and Mindfulness  Patients will be introduced to the concept of mindfulness in relation to music listening. Patients will be encouraged to listen an original version of a song, and then compare it to 1-2 covers of the same song. Patients may focus on song interpretation, vocal quality, instrumentation, and other musical qualities to aid in mindful listening. Patients will discuss the benefits of staying of mindful, challenges of staying mindful, and how mindfulness may apply to their lives.    Focus: Mindfulness, Grounding Techniques    Goals: Increase Attention to Task, Develop/Maintain Coping Skills, Increase Socialization/Building Community, Improve Mood, Improve/Maintain Self-Expression, Decrease Impulsive Behaviors, Improve Insight/Self-Awareness    Songs used: \"Heart of Glass\" original by Kallie; cover by Lanette Valles     Signature: Chandni Saleh, Licensed Professional Music Therapist (LPMT)  
Patient did not attend group.    Date: 11/22/2024    Group Start Time: 0910  Group End Time: 1000  Group Topic: Music Therapy    [facility] Chandni Chow    \"End of Beginning\" by MARQUIS Mamta Analysis  Exploration, Clarification, Mamta Analysis/Song Discussion, Receptive Music Listening    Patients will listen to \"End of Beginning\" by DJO, and discuss possible themes/interpretations derived from the song. Patients will reflect on how they might relate the lyrics to their personal lives.     Focus: Acceptance, Change, Personal Growth, Processing    Goals: Improve Mood, Improve Insight/Self-Awareness, Increase Socialization/Community Building, Improve Self-Expression, Improve Attention to Task, Improve Coping Skills/Develop Coping Skills, Promote Identity Development     Signature: Chandni Saleh Licensed Professional Music Therapist (LPMT)  
Patient did not attend group.    Date: 11/22/2024    Group Start Time: 1015  Group End Time: 1055  Group Topic: Psychoeducation    [facility] Chandni Chow    Building Self-Esteem through Music    Validation Statements  Patients will choose from a variety of un-pitched and pitched percussion instruments. MT will accompany patient instrument playing with light guitar strumming. Patients will listen to a series of validation statements and be asked to play their instruments when they hear a statement they resonate with.     Self-Love Affirmations  Patients will select at least one self-love affirmation from a provided list. Patients will be encouraged to maintain a steady beat on the instrument of their choice while MT plays guitar accompaniment. Patients will rhythmically chant their affirmations starting with \"I/My\" statements, then transitioning to \"We/Our\" statements (ex: \"I am strong\" --> \"We are strong\"). Patients will reflect on/discuss takeaways from the overall group experience, and determine if they noticed a change in mood.    Focus: Creative Expression, Processing, Self-Esteem Building  Goals: Improve Mood, Increase Socialization/Connection with Peers, Improve Self-Esteem, Improve Self-Expression, Provide Sense of Autonomy, Increase Sensory Stimulation, Develop Coping Skills, Improve Self-Awareness/Insight     Signature: Chandni Saleh, Licensed Professional Music Therapist (LPMT)  
Patient did not attend group.    Date: 11/26/2024    Group Start Time: 0912  Group End Time: 0953  Group Topic: Community Meeting    [facility] [place] [facility] Chow    Building Self-Esteem through Music  Mamta Analysis/Discussion, Recorded Music Listening, Education, Support, Exploration    Patients will listen to the song \"Love Me More\" by Manoj Nagel and identify lyrics, themes, & interpretations that they resonate with. Patients will express their thoughts on the songs and how it may relate to their personal lives. Patients will be given the option to select self-love affirmations that apply to them, identify strengths/positive qualities about themselves, and share one way they can practice self-love.     Focus: Processing, Challenging Negative Self-Talk  Goals: Improve Mood, Decrease Isolation, Increase Sense of Community/Socialization, Improve Self-Esteem, Improve Self-Expression, Develop Coping Skills, Improve Self-Awareness/Insight     Signature: Chandni Saleh, Licensed Professional Music Therapist (LPMT)  
Patient did not attend group.    Date: 11/26/2024    Group Start Time: 1015  Group End Time: 1050  Group Topic: Music Therapy    [facility] [place] [facility] Chandni Chow    Letter of Support to Past Self  Clarifying, Exploration, Composition, Live Music Listening, Songwriting     Patients will listen to \"Hey Gene\" by the Beatles and explore what support looks like/means to them. Patients will identify how it feels to receive the support they find helpful, and compare it to their feelings when they receive support they do not find helpful.  Patients will complete an individual terrence substitution in the style of \"Hey Gene\" framed with the idea of Writing a Letter of Support to your Past Self. Patients will have the option to verbally share their song and/or listen to LPMT recreate their song live with guitar.     Focus: Challenging Negative Self-Talk, Identity Development, Support, Processing    Goals: Improve Mood, Improve Insight/Self-Awareness, Increase Socialization/Community Building, Improve Self-Expression, Improve Attention to Task, Develop/Improve Coping Skills, Improve Emotion Regulation Skills     Signature: Chandni Saleh, Licensed Professional Music Therapist (LPMT)  
Patient joined to listen to the last few minutes of group.     Date: 11/19/2024    Group Start Time: 1005  Group End Time: 1040  Group Topic: Psychoeducation    [facility] [place] 3 Chandni Chow    Coping Skills  Clarification, Confrontation, Education, Exploration, Problem-Solving, Support/Validation    Patients will learn about coping skills including: techniques to stay grounded/mindful; ways to express themselves/their emotions; encouraging social support; stress management tips; how to challenge negative thoughts; how to maintain mental health treatment; and how to improve their overall wellness. Patients will reflect on which coping strategies they've utilized in the past, and which they might utilize in the future.     Focus: Coping Skills Development    Goals: Improve/Maintain Attention to Task, Improve/Maintain Cognitive Skills, Improve/Maintain Insight / Self-Awareness, Increase/Maintain Level of Socialization, Improve/Maintain Self-Expression, Improve/Maintain Use of Coping Skills, and Promote Reality Orientation     Signature: Chandni Saleh, Licensed Professional Music Therapist (LPMT)  
Progressing towards goal    Signature: Chandni Saleh, Licensed Professional Music Therapist (LPMT)

## 2024-11-27 NOTE — DISCHARGE SUMMARY
Supplied), 234 mg, IntraMUSCular, Once, 234 mg at 11/26/24 1222 **FOLLOWED BY** [START ON 12/3/2024] paliperidone palmitate ER (INVEGA SUSTENNA) IM injection 156 mg (Patient Supplied), 156 mg, IntraMUSCular, Once **FOLLOWED BY** [START ON 12/24/2024] paliperidone palmitate ER (INVEGA SUSTENNA) IM injection 156 mg (Patient Supplied), 156 mg, IntraMUSCular, Q30 Days, Oskar Marie MD    divalproex (DEPAKOTE) DR tablet 500 mg, 500 mg, Oral, 2 times per day, Nikki Enriquez APRHUGO - CNP, 500 mg at 11/27/24 0855    paliperidone (INVEGA) extended release tablet 12 mg, 12 mg, Oral, Daily, Nikki Enriquez APRN - CNP, 12 mg at 11/27/24 0855    hydrOXYzine pamoate (VISTARIL) capsule 50 mg, 50 mg, Oral, Q6H PRN **OR** hydrOXYzine (VISTARIL) injection 50 mg, 50 mg, IntraMUSCular, Q6H PRN, Zeb Recinos MD    acetaminophen (TYLENOL) tablet 650 mg, 650 mg, Oral, Q4H PRN, Zeb Recinos MD    magnesium hydroxide (MILK OF MAGNESIA) 400 MG/5ML suspension 30 mL, 30 mL, Oral, Daily PRN, Zeb Recinos MD    aluminum & magnesium hydroxide-simethicone (MAALOX) 200-200-20 MG/5ML suspension 30 mL, 30 mL, Oral, PRN, Zeb Recinos MD    haloperidol (HALDOL) tablet 5 mg, 5 mg, Oral, Q6H PRN **OR** haloperidol lactate (HALDOL) injection 5 mg, 5 mg, IntraMUSCular, Q6H PRN, Zeb Recinos MD    benztropine mesylate (COGENTIN) injection 2 mg, 2 mg, IntraMUSCular, BID PRN, Zeb Recinos MD    traZODone (DESYREL) tablet 50 mg, 50 mg, Oral, Nightly PRN, Zeb Recinos MD, 50 mg at 11/24/24 2058    nicotine polacrilex (COMMIT) lozenge 2 mg, 2 mg, Oral, Q1H PRN, Zeb Recinos MD    Examination:  /87   Pulse 82   Temp 97.4 °F (36.3 °C) (Oral)   Resp 14   Ht 1.905 m (6' 3\")   Wt 85.9 kg (189 lb 6 oz)   SpO2 99%   BMI 23.67 kg/m²   Gait - steady    HOSPITAL COURSE::  Following admission to the hospital, patient had a complete physical exam and blood work up  Patient was monitored closely with suicide

## 2025-04-08 PROBLEM — U07.1 COVID-19: Status: ACTIVE | Noted: 2025-04-08

## 2025-04-08 PROBLEM — S46.919A SHOULDER STRAIN: Status: ACTIVE | Noted: 2025-04-08

## 2025-04-08 PROBLEM — Q28.2 CEREBRAL ARTERIOVENOUS MALFORMATION (HHS-HCC): Status: ACTIVE | Noted: 2025-04-08

## 2025-04-08 PROBLEM — G89.29 CHRONIC LEFT SHOULDER PAIN: Status: ACTIVE | Noted: 2017-01-26

## 2025-04-08 PROBLEM — M25.512 CHRONIC LEFT SHOULDER PAIN: Status: ACTIVE | Noted: 2017-01-26

## 2025-04-08 PROBLEM — F33.1 MODERATE EPISODE OF RECURRENT MAJOR DEPRESSIVE DISORDER: Status: ACTIVE | Noted: 2025-04-08

## 2025-04-08 PROBLEM — R30.0 DYSURIA: Status: ACTIVE | Noted: 2025-04-08

## 2025-04-08 PROBLEM — I10 HYPERTENSION: Status: ACTIVE | Noted: 2025-04-08

## 2025-04-08 PROBLEM — B02.9 HERPES ZOSTER WITHOUT COMPLICATION: Status: ACTIVE | Noted: 2025-04-08

## 2025-04-08 PROBLEM — G89.29 CHRONIC HEADACHE: Status: ACTIVE | Noted: 2025-04-08

## 2025-04-08 PROBLEM — R51.9 CHRONIC HEADACHE: Status: ACTIVE | Noted: 2025-04-08

## 2025-04-08 PROBLEM — M54.50 ACUTE LOW BACK PAIN: Status: ACTIVE | Noted: 2025-04-08

## 2025-04-08 PROBLEM — E78.5 HYPERLIPIDEMIA: Status: ACTIVE | Noted: 2025-04-08

## 2025-04-08 PROBLEM — J02.9 SORE THROAT: Status: ACTIVE | Noted: 2025-04-08

## 2025-04-08 PROBLEM — J06.9 ACUTE UPPER RESPIRATORY INFECTION: Status: ACTIVE | Noted: 2025-04-08

## 2025-04-08 PROBLEM — F32.A DEPRESSION: Status: ACTIVE | Noted: 2025-04-08

## 2025-04-08 RX ORDER — VALACYCLOVIR HYDROCHLORIDE 1 G/1
1 TABLET, FILM COATED ORAL 3 TIMES DAILY
COMMUNITY
Start: 2018-10-01

## 2025-04-08 RX ORDER — RIBOFLAVIN (VITAMIN B2) 100 MG
TABLET ORAL
COMMUNITY
Start: 2022-01-25

## 2025-04-08 RX ORDER — PAROXETINE 10 MG/1
10 TABLET, FILM COATED ORAL
COMMUNITY
Start: 2022-01-21 | End: 2025-04-24

## 2025-04-15 ENCOUNTER — APPOINTMENT (OUTPATIENT)
Dept: PRIMARY CARE | Facility: CLINIC | Age: 45
End: 2025-04-15
Payer: COMMERCIAL

## 2025-06-09 ENCOUNTER — APPOINTMENT (OUTPATIENT)
Dept: NEUROLOGY | Facility: CLINIC | Age: 45
End: 2025-06-09
Payer: COMMERCIAL

## 2025-06-11 ENCOUNTER — HOSPITAL ENCOUNTER (INPATIENT)
Age: 45
LOS: 6 days | Discharge: HOME | DRG: 885 | End: 2025-06-17
Attending: EMERGENCY MEDICINE | Admitting: PSYCHIATRY & NEUROLOGY
Payer: MEDICARE

## 2025-06-11 DIAGNOSIS — F20.0 PARANOID SCHIZOPHRENIA (HCC): Primary | ICD-10-CM

## 2025-06-11 LAB
ALBUMIN SERPL-MCNC: 4.3 G/DL (ref 3.5–4.6)
ALP SERPL-CCNC: 60 U/L (ref 35–104)
ALT SERPL-CCNC: 13 U/L (ref 0–41)
AMPHET UR QL SCN: NORMAL
ANION GAP SERPL CALCULATED.3IONS-SCNC: 8 MEQ/L (ref 9–15)
APAP SERPL-MCNC: <5 UG/ML (ref 10–30)
AST SERPL-CCNC: 14 U/L (ref 0–40)
BARBITURATES UR QL SCN: NORMAL
BASOPHILS # BLD: 0 K/UL (ref 0–0.2)
BASOPHILS NFR BLD: 0.3 %
BENZODIAZ UR QL SCN: NORMAL
BILIRUB SERPL-MCNC: 0.3 MG/DL (ref 0.2–0.7)
BILIRUB UR QL STRIP: NEGATIVE
BUN SERPL-MCNC: 20 MG/DL (ref 6–20)
CALCIUM SERPL-MCNC: 9.7 MG/DL (ref 8.5–9.9)
CANNABINOIDS UR QL SCN: NORMAL
CHLORIDE SERPL-SCNC: 103 MEQ/L (ref 95–107)
CHOLEST SERPL-MCNC: 175 MG/DL (ref 0–199)
CK SERPL-CCNC: 112 U/L (ref 0–190)
CLARITY UR: CLEAR
CO2 SERPL-SCNC: 28 MEQ/L (ref 20–31)
COCAINE UR QL SCN: NORMAL
COLOR UR: ABNORMAL
CREAT SERPL-MCNC: 1.04 MG/DL (ref 0.7–1.2)
DRUG SCREEN COMMENT UR-IMP: NORMAL
EOSINOPHIL # BLD: 0.1 K/UL (ref 0–0.7)
EOSINOPHIL NFR BLD: 0.9 %
ERYTHROCYTE [DISTWIDTH] IN BLOOD BY AUTOMATED COUNT: 12.3 % (ref 11.5–14.5)
ESTIMATED AVERAGE GLUCOSE: 111 MG/DL
ETHANOL PERCENT: NORMAL G/DL
ETHANOLAMINE SERPL-MCNC: <10 MG/DL (ref 0–0.08)
FENTANYL SCREEN, URINE: NORMAL
GLOBULIN SER CALC-MCNC: 3.9 G/DL (ref 2.3–3.5)
GLUCOSE SERPL-MCNC: 97 MG/DL (ref 70–99)
GLUCOSE UR STRIP-MCNC: NEGATIVE MG/DL
HBA1C MFR BLD: 5.5 % (ref 4–6)
HCT VFR BLD AUTO: 44.6 % (ref 42–52)
HDLC SERPL-MCNC: 39 MG/DL (ref 40–59)
HGB BLD-MCNC: 14.8 G/DL (ref 14–18)
HGB UR QL STRIP: NEGATIVE
KETONES UR STRIP-MCNC: ABNORMAL MG/DL
LDLC SERPL CALC-MCNC: 116 MG/DL (ref 0–129)
LEUKOCYTE ESTERASE UR QL STRIP: NEGATIVE
LYMPHOCYTES # BLD: 1.7 K/UL (ref 1–4.8)
LYMPHOCYTES NFR BLD: 18.5 %
MCH RBC QN AUTO: 30.6 PG (ref 27–31.3)
MCHC RBC AUTO-ENTMCNC: 33.2 % (ref 33–37)
MCV RBC AUTO: 92.1 FL (ref 79–92.2)
METHADONE UR QL SCN: NORMAL
MONOCYTES # BLD: 0.8 K/UL (ref 0.2–0.8)
MONOCYTES NFR BLD: 8.4 %
NEUTROPHILS # BLD: 6.6 K/UL (ref 1.4–6.5)
NEUTS SEG NFR BLD: 71.6 %
NITRITE UR QL STRIP: NEGATIVE
OPIATES UR QL SCN: NORMAL
OXYCODONE UR QL SCN: NORMAL
PCP UR QL SCN: NORMAL
PH UR STRIP: 5.5 [PH] (ref 5–9)
PLATELET # BLD AUTO: 200 K/UL (ref 130–400)
POTASSIUM SERPL-SCNC: 5 MEQ/L (ref 3.4–4.9)
PROPOXYPH UR QL SCN: NORMAL
PROT SERPL-MCNC: 8.2 G/DL (ref 6.3–8)
PROT UR STRIP-MCNC: ABNORMAL MG/DL
RBC # BLD AUTO: 4.84 M/UL (ref 4.7–6.1)
SALICYLATES SERPL-MCNC: <0.3 MG/DL (ref 15–30)
SODIUM SERPL-SCNC: 139 MEQ/L (ref 135–144)
SP GR UR STRIP: 1.03 (ref 1–1.03)
TRIGL SERPL-MCNC: 101 MG/DL (ref 0–150)
TSH SERPL-MCNC: 1.97 UIU/ML (ref 0.44–3.86)
URINE REFLEX TO CULTURE: ABNORMAL
UROBILINOGEN UR STRIP-ACNC: 0.2 E.U./DL
VALPROATE SERPL-MCNC: 73.6 UG/ML (ref 50–100)
WBC # BLD AUTO: 9.2 K/UL (ref 4.8–10.8)

## 2025-06-11 PROCEDURE — 80053 COMPREHEN METABOLIC PANEL: CPT

## 2025-06-11 PROCEDURE — 81003 URINALYSIS AUTO W/O SCOPE: CPT

## 2025-06-11 PROCEDURE — 90791 PSYCH DIAGNOSTIC EVALUATION: CPT | Performed by: SOCIAL WORKER

## 2025-06-11 PROCEDURE — 82077 ASSAY SPEC XCP UR&BREATH IA: CPT

## 2025-06-11 PROCEDURE — 6370000000 HC RX 637 (ALT 250 FOR IP): Performed by: PSYCHIATRY & NEUROLOGY

## 2025-06-11 PROCEDURE — 83036 HEMOGLOBIN GLYCOSYLATED A1C: CPT

## 2025-06-11 PROCEDURE — 93005 ELECTROCARDIOGRAM TRACING: CPT | Performed by: EMERGENCY MEDICINE

## 2025-06-11 PROCEDURE — 36415 COLL VENOUS BLD VENIPUNCTURE: CPT

## 2025-06-11 PROCEDURE — 82550 ASSAY OF CK (CPK): CPT

## 2025-06-11 PROCEDURE — 84443 ASSAY THYROID STIM HORMONE: CPT

## 2025-06-11 PROCEDURE — 85025 COMPLETE CBC W/AUTO DIFF WBC: CPT

## 2025-06-11 PROCEDURE — 80179 DRUG ASSAY SALICYLATE: CPT

## 2025-06-11 PROCEDURE — 80143 DRUG ASSAY ACETAMINOPHEN: CPT

## 2025-06-11 PROCEDURE — 1240000000 HC EMOTIONAL WELLNESS R&B

## 2025-06-11 PROCEDURE — 80164 ASSAY DIPROPYLACETIC ACD TOT: CPT

## 2025-06-11 PROCEDURE — 80307 DRUG TEST PRSMV CHEM ANLYZR: CPT

## 2025-06-11 PROCEDURE — 80061 LIPID PANEL: CPT

## 2025-06-11 PROCEDURE — 99285 EMERGENCY DEPT VISIT HI MDM: CPT

## 2025-06-11 RX ORDER — HYDROXYZINE PAMOATE 50 MG/1
50 CAPSULE ORAL EVERY 6 HOURS PRN
Status: DISCONTINUED | OUTPATIENT
Start: 2025-06-11 | End: 2025-06-17 | Stop reason: HOSPADM

## 2025-06-11 RX ORDER — HALOPERIDOL 5 MG/1
5 TABLET ORAL EVERY 6 HOURS PRN
Status: DISCONTINUED | OUTPATIENT
Start: 2025-06-11 | End: 2025-06-17 | Stop reason: HOSPADM

## 2025-06-11 RX ORDER — TRAZODONE HYDROCHLORIDE 50 MG/1
50 TABLET ORAL NIGHTLY PRN
Status: DISCONTINUED | OUTPATIENT
Start: 2025-06-11 | End: 2025-06-17 | Stop reason: HOSPADM

## 2025-06-11 RX ORDER — DIVALPROEX SODIUM 500 MG/1
500 TABLET, DELAYED RELEASE ORAL EVERY 12 HOURS SCHEDULED
Status: DISCONTINUED | OUTPATIENT
Start: 2025-06-11 | End: 2025-06-17 | Stop reason: HOSPADM

## 2025-06-11 RX ORDER — ACETAMINOPHEN 325 MG/1
650 TABLET ORAL EVERY 4 HOURS PRN
Status: DISCONTINUED | OUTPATIENT
Start: 2025-06-11 | End: 2025-06-17 | Stop reason: HOSPADM

## 2025-06-11 RX ORDER — BENZTROPINE MESYLATE 1 MG/ML
2 INJECTION, SOLUTION INTRAMUSCULAR; INTRAVENOUS 2 TIMES DAILY PRN
Status: DISCONTINUED | OUTPATIENT
Start: 2025-06-11 | End: 2025-06-17 | Stop reason: HOSPADM

## 2025-06-11 RX ORDER — HALOPERIDOL 5 MG/ML
5 INJECTION INTRAMUSCULAR EVERY 6 HOURS PRN
Status: DISCONTINUED | OUTPATIENT
Start: 2025-06-11 | End: 2025-06-17 | Stop reason: HOSPADM

## 2025-06-11 RX ORDER — HYDROXYZINE HYDROCHLORIDE 50 MG/ML
50 INJECTION, SOLUTION INTRAMUSCULAR EVERY 6 HOURS PRN
Status: DISCONTINUED | OUTPATIENT
Start: 2025-06-11 | End: 2025-06-17 | Stop reason: HOSPADM

## 2025-06-11 RX ORDER — MAGNESIUM HYDROXIDE/ALUMINUM HYDROXICE/SIMETHICONE 120; 1200; 1200 MG/30ML; MG/30ML; MG/30ML
30 SUSPENSION ORAL PRN
Status: DISCONTINUED | OUTPATIENT
Start: 2025-06-11 | End: 2025-06-17 | Stop reason: HOSPADM

## 2025-06-11 RX ADMIN — TRAZODONE HYDROCHLORIDE 50 MG: 50 TABLET ORAL at 22:21

## 2025-06-11 RX ADMIN — DIVALPROEX SODIUM 500 MG: 500 TABLET, DELAYED RELEASE ORAL at 21:38

## 2025-06-11 ASSESSMENT — SLEEP AND FATIGUE QUESTIONNAIRES
SLEEP PATTERN: INSOMNIA
DO YOU HAVE DIFFICULTY SLEEPING: YES
AVERAGE NUMBER OF SLEEP HOURS: 6
DO YOU USE A SLEEP AID: NO

## 2025-06-11 ASSESSMENT — LIFESTYLE VARIABLES
HOW MANY STANDARD DRINKS CONTAINING ALCOHOL DO YOU HAVE ON A TYPICAL DAY: 1 OR 2
HOW OFTEN DO YOU HAVE A DRINK CONTAINING ALCOHOL: MONTHLY OR LESS

## 2025-06-11 ASSESSMENT — PAIN - FUNCTIONAL ASSESSMENT: PAIN_FUNCTIONAL_ASSESSMENT: NONE - DENIES PAIN

## 2025-06-11 NOTE — VIRTUAL HEALTH
Raúl Fisher  [id number]  1980     Social Work Behavioral Health Crisis Assessment    06/11/25    Chief Complaint: psychosis     HPI: Patient is a 45 y.o. White (non-) male who presents for after Argument with mother. Patient presented to the ED on 06/11/25 from home.    Writer met with patient. Patient reported that he has been triggered recently due to his mother and him getting into frequent arguments. Patient reported that he is frustrated at mother because she is asking him to buy his own groceries in the house.    Patient presents with a pink slip from mental health provider Polly due to having Paranoid and delusions. Patient also reported to the ED team plan and intent to jump off of a bridge. Patient doesn't present to be a good historian at this time, he is denying all of this ti the writer.     Patient experiencing decompensation of mental status causing major disability in social and interpersonal functioning that should be addressed in an acute inpatient setting. Support system is unreliable. Concerns about patient's safety in the community      Collateral: Writer attempted to call mother twice no answer, Writer attempted to call brother no answer    Past Psychiatric History:  Previous Diagnosis/symptoms: Schizoaffective  Previous suicide attempts/self-harm: Denies  Inpatient psychiatric hospitalizations: unknown  Current outpatient psychiatric provider: Pia   Current therapist: Pia   Previous psychiatric medication trials: No prior medication trials  Current psychiatric medications: Listed   Family Psychiatric History: Denies    Sleep Hours: 6-7     Sleep concerns: denies    Use of sleep medications: denies    Substance Abuse History:  Tobacco: Denies  Alcohol: Denies  Marijuana: Denies  Stimulant: Denies  Opiates: Denies  Benzodiazepine: Denies  Other illicit drug usage: Denies  History of substance/alcohol abuse treatment: Denies    Social History:  Education: H.S.  Living

## 2025-06-11 NOTE — ED PROVIDER NOTES
[facility] EMERGENCY DEPARTMENT  EMERGENCY DEPARTMENT ENCOUNTER      Pt Name: Raúl Fisher  MRN: [id number]  Birthdate 1980  Date of evaluation: 6/11/2025  Provider: Constantine Briceño MD  4:14 PM    CHIEF COMPLAINT       Chief Complaint   Patient presents with    Psychiatric Evaluation     Pt brought to the ER by EMS pink slipped by rcon for paranoia and delusions, denies SI/HI         HISTORY OF PRESENT ILLNESS    Raúl Fisher is a 45 y.o. male who presents to the emergency department via EMS.  Initial history comes from the patient.  He told me that he told a provider earlier that he plan to jump off a bridge.  Now he says he is not suicidal, homicidal or hallucinating.  Denies any acute medical complaint.  Patient initially stated that he wanted to leave however I did speak with him further and showed him the pink slip that was written by the earlier evaluator noting that they had concerns for acute psychosis, that he was delusional and paranoid, thought cameras were recording him at all times and the walls were talking to him and voiced that he was suicidal  He denies previous suicide attempts  HPI  Chart review notes history of schizoaffective disorder prescribed Depakote and Invega  Nursing Notes were reviewed.    REVIEW OF SYSTEMS       Review of Systems   Unable to perform ROS: Psychiatric disorder       Except as noted above the remainder of the review of systems was reviewed and negative.       PAST MEDICAL HISTORY   No past medical history on file.      SURGICAL HISTORY     No past surgical history on file.      CURRENT MEDICATIONS       Previous Medications    DIVALPROEX (DEPAKOTE) 500 MG DR TABLET    Take 1 tablet by mouth every 12 hours    PALIPERIDONE (INVEGA) 6 MG EXTENDED RELEASE TABLET    Take 2 tablets by mouth daily    PALIPERIDONE PALMITATE ER (INVEGA SUSTENNA) 156 MG/ML TUYET IM INJECTION    Inject 156 mg into the muscle every 28 days       ALLERGIES     Molds & smuts, Danazol,

## 2025-06-11 NOTE — ED NOTES
Patient changed into psych safe clothing.   Urine  obtained and sent to lab.   Skin and contraband check performed.   Contraband check negative at this time.   Lab called to draw blood.  Pt at first would not change into psych clothing but after talking to Dr and staff decided to be cooperative however did state \" I refuse to go up to 3W. That will not happen.\"

## 2025-06-12 LAB
EKG ATRIAL RATE: 52 BPM
EKG DIAGNOSIS: NORMAL
EKG P AXIS: 53 DEGREES
EKG P-R INTERVAL: 154 MS
EKG Q-T INTERVAL: 456 MS
EKG QRS DURATION: 96 MS
EKG QTC CALCULATION (BAZETT): 424 MS
EKG R AXIS: 49 DEGREES
EKG T AXIS: 35 DEGREES
EKG VENTRICULAR RATE: 52 BPM

## 2025-06-12 PROCEDURE — 1240000000 HC EMOTIONAL WELLNESS R&B

## 2025-06-12 PROCEDURE — 6370000000 HC RX 637 (ALT 250 FOR IP): Performed by: PSYCHIATRY & NEUROLOGY

## 2025-06-12 PROCEDURE — 99223 1ST HOSP IP/OBS HIGH 75: CPT | Performed by: PSYCHIATRY & NEUROLOGY

## 2025-06-12 PROCEDURE — 93010 ELECTROCARDIOGRAM REPORT: CPT | Performed by: INTERNAL MEDICINE

## 2025-06-12 RX ORDER — PALIPERIDONE 3 MG/1
3 TABLET, EXTENDED RELEASE ORAL
Status: DISCONTINUED | OUTPATIENT
Start: 2025-06-12 | End: 2025-06-14

## 2025-06-12 RX ADMIN — DIVALPROEX SODIUM 500 MG: 500 TABLET, DELAYED RELEASE ORAL at 20:43

## 2025-06-12 RX ADMIN — DIVALPROEX SODIUM 500 MG: 500 TABLET, DELAYED RELEASE ORAL at 08:27

## 2025-06-12 RX ADMIN — PALIPERIDONE 3 MG: 3 TABLET, EXTENDED RELEASE ORAL at 16:16

## 2025-06-12 ASSESSMENT — PATIENT HEALTH QUESTIONNAIRE - PHQ9
SUM OF ALL RESPONSES TO PHQ QUESTIONS 1-9: 0
2. FEELING DOWN, DEPRESSED OR HOPELESS: NOT AT ALL
1. LITTLE INTEREST OR PLEASURE IN DOING THINGS: NOT AT ALL
SUM OF ALL RESPONSES TO PHQ QUESTIONS 1-9: 0

## 2025-06-12 NOTE — GROUP NOTE
Patient did not attend group.    Date: 6/12/2025    Group Start Time: 0915  Group End Time: 1020  Group Topic: Psychoeducation    [facility] [place] 3W Chandni Chow    Core Beliefs  Challenging, Clarifying, Education, Exploration    Patients will learn about core beliefs, or deeply held beliefs that influence how we interpret our experiences. Patients will identify a negative core belief about themselves, and provide pieces of evidence that are contrary to the negative belief. Patients will reflect on how core beliefs can influence their future decision-making, and how to improve self-esteem.    Focus: Developing Insight, Reframing, Coping Skills  Goals: Improve Attention to Task, Improve Cognitive Skills, Improve Identity Development, Improve Insight, Increase/Maintain Level of Socialization, Improve Use of Coping Skills, Promote Reality Orientation      Signature: Chandni Saleh Licensed Professional Music Therapist (LPMT)

## 2025-06-12 NOTE — GROUP NOTE
Date: 6/12/2025    Group Start Time: 1220  Group End Time: 1300  Group Topic: Music Therapy    [facility] 3[facility] Chandni Chow    Emotion Identification and Mindfulness Through Classical Music Listening  Music Reminiscence, Receptive Music Listening    Patients will learn about mindfulness. Patients will encouraged to practice mindfulness through identifying emotions they experience while listening to classical music. Patients will listen to a variety of classical pieces and discuss emotions they feel are represented in the music as a group. Patients will reflect on how emotion awareness/identification and practicing mindfulness could be beneficial in their lives.    Pieces used:  \"Flight of the Bumblebee\" by Selvin  \"Symphony No. 94 in G major, 'Surprise': II. Andante\" by Hansel Hall  \"Act II. No. 10, Moderato\" from [place] by Jayy Borja  \"March Slav\" by Jayy Borja  \"Alda de Lune\" by Claude Debussy  \"Symphony No. 3 in F major, Op. 90, III. Poco allegretto\" by Fadia Mccormack    Focus: Emotion Awareness/Identification, Mindfulness Techniques  Goals: Improve/Maintain Attention to Task, Improve/Maintain Self-Awareness, Improve/Maintain Self-Expression, Improve/Maintain Use of Coping Skills, Promote Reality Orientation     Patient listened to recorded classical music and engaged in peer discussions. Patient occasionally provided ideas as to what emotions he felt were represented in the music, and referenced familiar movies to explain why. Patient was somewhat social with peers.     Attended: 1/2-3/4 attendance  Participation Level: Active Listener  Participation Quality: Attentive, Sharing, and Supportive  Affect/Mood: Congruent/Euthymic  Speech: spontaneous, normal rate, and normal volume   Thought Content/Processes: Vague  Level of consciousness: Alert  Response: Able to verbalize current knowledge/experience  Outcomes: Progressing towards goal and Actively participated in the group

## 2025-06-12 NOTE — CONSULTS
Consult Note            Date:6/12/2025        Patient Name:Raúl Fisher     YOB: 1980     Age:45 y.o.    Reason for Consult: Evaluation recommendation for chronic disease management    Chief Complaint     Chief Complaint   Patient presents with    Psychiatric Evaluation     Pt brought to the ER by EMS pink slipped by elisabet for paranoia and delusions, denies SI/HI          History Obtained From   patient, electronic medical record    History of Present Illness   Patient is a 5-year-old male admitted for thoughts of suicide ideation.  Per EMR patient reported thoughts of suicide ideation and then retrack his story.  Patient was medically cleared.  Patient denies chest pain, palpitations, lightheadedness, headache, dizziness, shortness of breath, cough, N/V/D, and changes in appetite.  Patient also denies smoking, illicit drug, and alcohol use.  Denies self-inflicted injuries or wounds.   Hospitalist consulted for evaluation and recommendations for acute and chronic disease management while receiving inpatient psych services.        Past Medical History   No past medical history on file.     Past Surgical History   No past surgical history on file.     Medications     Prior to Admission medications    Medication Sig Start Date End Date Taking? Authorizing Provider   divalproex (DEPAKOTE) 500 MG DR tablet Take 1 tablet by mouth every 12 hours 11/27/24   Oskar Marie MD        paliperidone (INVEGA) extended release tablet 3 mg, Dinner  acetaminophen (TYLENOL) tablet 650 mg, Q4H PRN  magnesium hydroxide (MILK OF MAGNESIA) 400 MG/5ML suspension 30 mL, Daily PRN  aluminum & magnesium hydroxide-simethicone (MAALOX PLUS) 200-200-20 MG/5ML suspension 30 mL, PRN  haloperidol (HALDOL) tablet 5 mg, Q6H PRN   Or  haloperidol lactate (HALDOL) injection 5 mg, Q6H PRN  benztropine mesylate (COGENTIN) injection 2 mg, BID PRN  traZODone (DESYREL) tablet 50 mg, Nightly PRN  hydrOXYzine pamoate (VISTARIL) capsule

## 2025-06-12 NOTE — CARE COORDINATION
Leisure Assessment  June 12, 2025 /  1040  am    Appearance: Alert and Appears as stated age  Current Mental Status: Calm and Cooperative  Affect/Mood: Congruent/ Euthymic  Thought Content/Processes: Disorganized  Insight/Judgement: Poor insight and Poor judgment  Speech: spontaneous, normal rate, and normal volume  Admit Status:  Involuntary     Patient identified his leisure interests are biking, walking, and watching TikTok. Patient described he spends his time \"by myself with TikTok.\" Patient shared that he feels comfortable expressing his emotions, and that he has social support. Patient reported increased conflict between his and his mom/step-dad, with whom he lives, and stated that \"everyone keeps saying I'm gonna jump off a bridge.\" Patient expressed that he wants to go to [place] where he has other family. Patient identified his strength is that he can \"build houses.\"    Recommendations: Decrease Impulsivity/Impulsive Behaviors, Improve/Maintain Coping Skills Development, Improve/Maintain Emotion Regulation Skills/Mood, and Promote Reality Orientation    Signature: Chandni Saleh, Licensed Professional Music Therapist (LPMT)

## 2025-06-12 NOTE — CARE COORDINATION
Psychosocial Assessment     Admission Reason: Per Er Note: \"Patient is a 45 y.o. White (non-) male who presents for after Argument with mother. Patient presented to the ED on 06/11/25 from home.\"    C-SSRS Lifetime Recent Completed - Current Suicide Risk:   [x] No Risk  [] Low [] Moderate [] High     Risk Factors:     Male Gender, social isolation, active psychosis    Protective Factors:   Supportive family, stable housing    Gender:  [x] Male [] Female [] Transgender  [] Other    Sexual Orientation:  [] Heterosexual [] Homosexual [] Bisexual [] Other  Patient did not disclose    Current or Past Mental Health and/or Addictions Treatment (and response to treatment):  [x] Yes, When and Where: Hx of 3W admissions, last 3W November 2024  [] No    Substance Use/Alcohol Use/Addiction (document name of substance, age of onset, how much and how often, route of use and date of last use):  [] Reports   Substance:  Age of Onset:   How Much and how often:  Last Usage    [x] Patient was provided a listing of community sobriety resources on admission.    [x] Denies    AUDIT: 0  DAST: 0  PHQ 9: 0    Sobriety Education provided:  [] Yes  [] No  [x] N/A [] Refused    Learners: Patient []  Family []  Significant Other  [] Caregiver [] Other []   Readiness: Eager []   Acceptance  []   Nonacceptances []   Refused []   Method: Explanation []   Handout []   Response: Verbalizes Understanding []   No evidence of Learning []   Refuses []     Referral made to [facility] for sobriety services and support  [] Yes       Date:              [x] No    Family History of Mental Illness or Substance Use/Abuse:   [] Yes (Specify)    [x] No    Trauma and Abuse History:   [x] Reports   ( Physical [x]  Verbal [x]  Emotional [x]  Financial []   Sexual [] )  [] Denies      Legal History:  []  Yes (Specify)    [x] No     Involvement:  [] Yes (Specify)    [x] No     Employment and/or Benefits:    [] Yes (Specify)   SSD [x]  SSI []

## 2025-06-12 NOTE — H&P
illness.    Medications Prior to Admission:   Medications Prior to Admission: divalproex (DEPAKOTE) 500 MG DR tablet, Take 1 tablet by mouth every 12 hours    Compliance:no    Psychiatric Review of Systems       Depression: denies     Mi or Hypomania:  yes - mi     Panic Attacks:  yes      Phobias:  no     Obsessions and Compulsions:  no     PTSD : no     Hallucinations:  yes      Delusions:  yes     Substance Abuse History:  ETOH: no   Marijuana: no  Opiates: no  Other Drugs: no      Past Psychiatric History:  Previous Diagnosis/symptoms: Schizoaffective  Previous suicide attempts/self-harm: Denies  Inpatient psychiatric hospitalizations: unknown  Current outpatient psychiatric provider: Pia   Current therapist: Pia   Previous psychiatric medication trials: No prior medication trials  Current psychiatric medications: Listed   Family Psychiatric History: Denies    Past Medical History:    No past medical history on file.    Past Surgical History:    No past surgical history on file.    Allergies:   Molds & smuts, Danazol, Penicillins, and Pollen extract    Family History  No family history on file.    Social History:  Education: H.S.  Living Situation/Interest: mother   Marital/Committed relationship and parenting hx: single  Occupation: [employer]   Legal History/Hx of Violence: Denies  Spiritual History: Denies  Psychological trauma, neglect, or abuse: denies hx of trauma/abuse   Access to guns or other weapons: denies having access to firearms/dangerous weapons       EXAMINATION:    REVIEW OF SYSTEMS:    ROS:  [x] All negative/unchanged except if checked. Explain positive(checked items) below:  [] Constitutional  [] Eyes  [] Ear/Nose/Mouth/Throat  [] Respiratory  [] CV  [] GI  []   [] Musculoskeletal  [] Skin/Breast  [] Neurological  [] Endocrine  [] Heme/Lymph  [] Allergic/Immunologic    Explanation:     Vitals:  /86   Pulse 63   Temp 98.1 °F (36.7 °C) (Oral)   Resp 20   Ht 1.88 m (6' 2\")

## 2025-06-13 PROCEDURE — 6370000000 HC RX 637 (ALT 250 FOR IP): Performed by: PSYCHIATRY & NEUROLOGY

## 2025-06-13 PROCEDURE — 1240000000 HC EMOTIONAL WELLNESS R&B

## 2025-06-13 RX ADMIN — PALIPERIDONE 3 MG: 3 TABLET, EXTENDED RELEASE ORAL at 16:47

## 2025-06-13 RX ADMIN — DIVALPROEX SODIUM 500 MG: 500 TABLET, DELAYED RELEASE ORAL at 20:55

## 2025-06-13 RX ADMIN — DIVALPROEX SODIUM 500 MG: 500 TABLET, DELAYED RELEASE ORAL at 08:42

## 2025-06-13 RX ADMIN — TRAZODONE HYDROCHLORIDE 50 MG: 50 TABLET ORAL at 20:55

## 2025-06-13 NOTE — CARE COORDINATION
FAMILY COLLATERAL NOTE    Family/Support Name:  Cruz Fisher   Contact #:[phone]   Relationship to Pt:: Brother    Per Voice Recording \"the call can not be completed at this time, please try your call again later.\"    GUILHERME Kuhn

## 2025-06-13 NOTE — GROUP NOTE
Date: 6/13/2025    Group Start Time: 1020  Group End Time: 1055  Group Topic: Music Therapy    [facility] [place] 3W Chandni Chow    Live Music Song Choices  Live Music Listening, Receptive Music Listening, Re-creative Singing    Patients will be given a songbook and offered the opportunity to select (a) song(s) of their choice for live music listening on [employer]. Patients will be encouraged to sing along, move along, and listen to the music.    Focus: Building Positive Experiences, Relaxation  Goals: Increase/Maintain Level of Socialization, Improve Mood, Improve/Maintain Self-Esteem, Improve/Maintain Use of Coping Skills, Promote Reality Orientation     Patient selected several songs for live music listening and smiled during familiar music. Patient socialized with peers/staff appropriately. Patient verbalized appreciation for group.     Attended: 3/4-full attendance  Participation Level: Active Listener and Interactive  Participation Quality: Attentive, Sharing, and Supportive  Affect/Mood: Congruent/Euthymic  Speech: spontaneous, normal rate, and normal volume   Thought Content/Processes: Linear  Level of consciousness: Alert  Response: Able to verbalize current knowledge/experience  Outcomes: Progressing towards goal and Actively participated in the group experience    Signature: Chandni Saleh, Licensed Professional Music Therapist (LPMT)

## 2025-06-13 NOTE — GROUP NOTE
Date: 6/13/2025    Group Start Time: 0920  Group End Time: 1000  Group Topic: Community Meeting    [facility] [place] [facility]    Motivation Playlist  Music Analysis/Discussion, Playlist Building, Receptive Music Listening    Patients will discuss what motivation means to them, and express what things in their lives motivate them. Patients will roll a die and select a song from 3 choices corresponding with the die. Patients will explore what elements make a song motivational while listening to music. Patients will be encouraged to share their thoughts with the group.    Focus: Emotion Identification/Regulation, Empowerment  Goals: Improve Identity Development, Improve Self-Awareness, Increase/Maintain Level of Socialization, Improve Mood, Improve/Maintain Self-Esteem, Improve/Maintain Self-Expression, Improve Use of Coping Skills     Patient identified that a barrier to staying motivated could be \"aggravation from others.\" Patient selected a song to hear, and sang/moved to familiar music. Patient engaged in discussions with peers and was appropriately social. Patient verbalized grandiose delusion that he is a  and offered to handle a peer's (GUSTAVO) legal concern.    Attended: Full attendance  Participation Level: Active Listener and Interactive  Participation Quality: Attentive, Sharing, and Supportive  Affect/Mood: Congruent/Euthymic  Speech: spontaneous, normal rate, and normal volume   Thought Content/Processes: Linear and Illogical or self-contradictory  Level of consciousness: Alert  Response: Able to verbalize current knowledge/experience  Outcomes: Progressing towards goal and Actively participated in the group experience    Signature: Chandni Saleh, Licensed Professional Music Therapist (LPMT)

## 2025-06-14 PROCEDURE — 6370000000 HC RX 637 (ALT 250 FOR IP): Performed by: PSYCHIATRY & NEUROLOGY

## 2025-06-14 PROCEDURE — 1240000000 HC EMOTIONAL WELLNESS R&B

## 2025-06-14 RX ORDER — PALIPERIDONE 3 MG/1
3 TABLET, EXTENDED RELEASE ORAL 2 TIMES DAILY
Status: DISCONTINUED | OUTPATIENT
Start: 2025-06-15 | End: 2025-06-17 | Stop reason: HOSPADM

## 2025-06-14 RX ADMIN — DIVALPROEX SODIUM 500 MG: 500 TABLET, DELAYED RELEASE ORAL at 08:59

## 2025-06-14 RX ADMIN — PALIPERIDONE 3 MG: 3 TABLET, EXTENDED RELEASE ORAL at 16:16

## 2025-06-14 RX ADMIN — DIVALPROEX SODIUM 500 MG: 500 TABLET, DELAYED RELEASE ORAL at 20:54

## 2025-06-14 NOTE — GROUP NOTE
Group Therapy Note    Date: 6/14/2025    Group Start Time: 0930  Group End Time: 1045  Group Topic: Psychoeducation    [facility] 3[facility] Anitha Martin    Group Therapy Note    BINGO: 70's, 80's, and 90's     Description:   Patients will be given a BINGO sheet as well as a sheet marker/chips to put down on the sheet when the song played is called. Patients can identify the song and artist, but is not necessary. Patients are free to speak with peers and have conversations amongst each other.     Goals:   Improve/Maintain Attention to Task, Improve/Maintain Cognitive Skills, Improve/Maintain Identity Development, Increase/Maintain Level of Socialization, Improve Mood, Improve/Maintain Self-Expression, Improve/Maintain Use of Coping Skills, and Increase Sensory Stimulation       Pt was pleasant during group session. Pt initially declined to complete the intervention as directed, and proceeded to complete it as directed. Client verbalized that all songs played were \"his favorite song.\" Pt briefly spoke about his memories with the songs played. Pt was appropriate with utensils.     Attended: Full attendance  Participation Level: Active Listener and Interactive  Participation Quality: Appropriate, Attentive, and Sharing  Affect/Mood: Congruent/Euthymic  Speech: normal rate and normal volume   Insight/Judgement: Fair insight and Fair judgment  Response: Able to verbalize current knowledge/experience, Able to verbalize/acknowledge new learning, Able to retain information, and Capable of insight    Signature: Anitha Segura, Psychoeducational Specialist

## 2025-06-15 LAB — VALPROATE SERPL-MCNC: 53.1 UG/ML (ref 50–100)

## 2025-06-15 PROCEDURE — 1240000000 HC EMOTIONAL WELLNESS R&B

## 2025-06-15 PROCEDURE — 80164 ASSAY DIPROPYLACETIC ACD TOT: CPT

## 2025-06-15 PROCEDURE — 6370000000 HC RX 637 (ALT 250 FOR IP): Performed by: PSYCHIATRY & NEUROLOGY

## 2025-06-15 PROCEDURE — 36415 COLL VENOUS BLD VENIPUNCTURE: CPT

## 2025-06-15 PROCEDURE — 6370000000 HC RX 637 (ALT 250 FOR IP): Performed by: NURSE PRACTITIONER

## 2025-06-15 RX ADMIN — PALIPERIDONE 3 MG: 3 TABLET, EXTENDED RELEASE ORAL at 08:14

## 2025-06-15 RX ADMIN — DIVALPROEX SODIUM 500 MG: 500 TABLET, DELAYED RELEASE ORAL at 21:20

## 2025-06-15 RX ADMIN — DIVALPROEX SODIUM 500 MG: 500 TABLET, DELAYED RELEASE ORAL at 08:14

## 2025-06-15 RX ADMIN — PALIPERIDONE 3 MG: 3 TABLET, EXTENDED RELEASE ORAL at 21:20

## 2025-06-15 NOTE — GROUP NOTE
Group Therapy Note    Date: 6/15/2025    Group Start Time: 1200  Group End Time: 1230  Group Topic: Psychoeducation    [facility] Anitha Martin    Group Therapy Note    Healthy Boundaries    Description:  Patients will be directed to complete a worksheet that has prompts about boundaries and how to set them. These prompts include who the boundary needs to be set with, what type of boundary it is, what the fears are associated with setting the boundary, how is not setting the boundary affecting them, how they would feel if they placed the healthy boundary, and a positive affirmation. Topics of what healthy vs unhealthy boundaries consist of, how to set boundaries with people, how to identify boundaries to set, and what the possible outcomes of these boundaries will be mentioned within this intervention. Patients will be assessed on reality orientation, healthy boundary vs unhealthy boundary, positive or negative outlook, and who the boundary is about (insight into issues).     Goals:   Improve/Maintain Attention to Task, Improve/Maintain Cognitive Skills, Improve/Maintain Identity Development, Improve/Maintain Insight / Self-Awareness, Improve/Maintain Self-Esteem, Improve/Maintain Self-Expression, and Improve/Maintain Use of Coping Skills      Pt was in attendance for the entirety of group. Pt was attentive to peer conversation and made contributions periodically. Pt completed the intervention as directed by this writer.     Attended: Full attendance  Participation Level: Active Listener  Participation Quality: Appropriate and Attentive  Affect/Mood: Congruent/Euthymic  Speech: normal rate and normal volume   Insight/Judgement: Fair insight and Fair judgment  Response: Able to verbalize current knowledge/experience and Able to retain information    Signature: Anitha Segura, Psychoeducational Specialist

## 2025-06-15 NOTE — GROUP NOTE
Group Therapy Note    Date: 6/15/2025    Group Start Time: 0915  Group End Time: 1100  Group Topic: Psychoeducation    [facility] Anitha Martin    Group Therapy Note    End of week check-in     Description:   Patients will be given a paper to complete during this intervention. This template includes questions about patient's weeks: how were you feeling this week, what color would describe your week, etc. Patients will be encouraged to share their findings with peers/staff.     Goals:  Improve/Maintain Attention to Task, Improve/Maintain Cognitive Skills, Improve/Maintain Identity Development, Improve/Maintain Insight / Self-Awareness, Improve/Maintain Self-Esteem, Improve/Maintain Self-Expression, and Improve/Maintain Use of Coping Skills       Pt was in attendance for the entirety of group. Pt displayed active listening skills by looking in the direction of peers when they were speaking and laughing when others did as well. Before the intervention, pt spoke about his family and how he \"is not on speaking terms with them, but it is ok and he hopes well for them\" after the topic of \"Father's Day\" was brought up by another peer.     Attended: Full attendance  Participation Level: Active Listener and Interactive  Participation Quality: Appropriate, Attentive, and Sharing  Affect/Mood: Congruent/Euthymic  Speech: normal rate and normal volume   Insight/Judgement: Fair insight and Fair judgment  Response: Able to verbalize current knowledge/experience, Able to verbalize/acknowledge new learning, Able to retain information, and Capable of insight    Signature: Anitha Segura, Psychoeducational Specialist

## 2025-06-16 PROCEDURE — 1240000000 HC EMOTIONAL WELLNESS R&B

## 2025-06-16 PROCEDURE — 6370000000 HC RX 637 (ALT 250 FOR IP): Performed by: NURSE PRACTITIONER

## 2025-06-16 PROCEDURE — 6370000000 HC RX 637 (ALT 250 FOR IP): Performed by: PSYCHIATRY & NEUROLOGY

## 2025-06-16 RX ORDER — FLUTICASONE PROPIONATE 50 MCG
1 SPRAY, SUSPENSION (ML) NASAL DAILY PRN
Status: DISCONTINUED | OUTPATIENT
Start: 2025-06-16 | End: 2025-06-17 | Stop reason: HOSPADM

## 2025-06-16 RX ADMIN — PALIPERIDONE 3 MG: 3 TABLET, EXTENDED RELEASE ORAL at 21:18

## 2025-06-16 RX ADMIN — DIVALPROEX SODIUM 500 MG: 500 TABLET, DELAYED RELEASE ORAL at 08:54

## 2025-06-16 RX ADMIN — TRAZODONE HYDROCHLORIDE 50 MG: 50 TABLET ORAL at 21:18

## 2025-06-16 RX ADMIN — DIVALPROEX SODIUM 500 MG: 500 TABLET, DELAYED RELEASE ORAL at 21:18

## 2025-06-16 RX ADMIN — PALIPERIDONE 3 MG: 3 TABLET, EXTENDED RELEASE ORAL at 08:54

## 2025-06-16 NOTE — GROUP NOTE
Group Therapy Note    Date: 6/16/2025    Group Start Time: 1010  Group End Time: 1100  Group Topic: Art Therapy     [facility] Alaina Segal, ELLIEW        Group Therapy Note    Attendees: 7       Patient's Goal:  To participate in morning group art therapy.     Notes:  Patient attended the morning group art therapy session. He participated in hand coloring and was very social. Patient verbalized how much the group helped him calm. He seemed to benefit from the intervention.     Status After Intervention:  Improved    Participation Level: Active Listener    Participation Quality: Appropriate      Speech:  normal      Thought Process/Content: Logical      Affective Functioning: Congruent      Mood: elevated      Level of consciousness:  Alert      Response to Learning: Able to verbalize current knowledge/experience      Endings: None Reported    Modes of Intervention: Activity      Discipline Responsible: Psychoeducational Specialist      Signature:  Alaina Ocasio MA ATR LPAT

## 2025-06-16 NOTE — GROUP NOTE
Date: 6/16/2025    Group Start Time: 0915  Group End Time: 0948  Group Topic: Music Therapy    [facility] 3[facility] Chandni Chow    Identity Exploration  [place] Analysis/Discussion, Receptive Music Listening    Patients will listen to \"Unstoppable\" by Mallory and discuss lyrics/themes/interpretations derived from the song. Patients will explore how the song's message may apply to their lives.    Focus: Identity Exploration, Processing   Goals: Improve/Maintain Insight / Self-Awareness, Improve/Maintain Self-Esteem, Improve/Maintain Self-Expression, Promote Reality Orientation     Patient listened to recorded music and engaged in peer song discussions. Patient expressed that sometimes it is easier to \"not be understood\" by others for fear of rejection. Patient presented as an attentive listener AEB nodding in response to peer statements.     Attended: Full attendance  Participation Level: Active Listener  Participation Quality: Sharing  Affect/Mood: Congruent/Euthymic  Speech: normal rate and normal volume   Thought Content/Processes: Vague  Level of consciousness: Alert  Response: Able to verbalize current knowledge/experience  Outcomes: Progressing towards goal and Actively participated in the group experience    Signature: Chandni Saleh, Licensed Professional Music Therapist (LPMT)

## 2025-06-16 NOTE — FLOWSHEET NOTE
Pt has been visible out in the dayroom most of the afternoon. Pt has been attending groups and is social with select peers. Pt has a flat affect with a irritable edge. Pt reports a adequate appetite and sleep. Pt has been medication compliant and denies SI/HI/AVH.

## 2025-06-16 NOTE — GROUP NOTE
Date: 6/16/2025    Group Start Time: 1515  Group End Time: 1550  Group Topic: Psychoeducation    [facility] Chandni Chow    Hierarchy of Needs  Clarifying, Education, Exploration, Support    Patients will learn about Michelle's hierarchy of needs (physiological, safety, social, esteem, self-actualization) and determine where they might have unfulfilled needs. Patients will explore how they can realistically meet their needs as a group.     Focus: Cognitive Skills  Goals: Improve/Maintain Insight / Self-Awareness, Improve/Maintain Use of Coping Skills, Promote Reality Orientation     Patient listened to discussions about Maslow's hierarchy, but declined to verbally contribute. Patient presented as withdrawn AEB poor eye contact, limited social interaction, and constricted affect. Patient verbalized appreciation for group.    Attended: Full attendance  Participation Level: Active Listener  Participation Quality: Appropriate  Affect/Mood: Constricted/Reserved  Speech: normal rate and normal volume   Thought Content/Processes: Vague  Level of consciousness: Alert  Response: Able to retain information  Outcomes: Progressing towards goal    Signature: Chandni Saleh Licensed Professional Music Therapist (LPMT)

## 2025-06-17 VITALS
TEMPERATURE: 98.4 F | OXYGEN SATURATION: 98 % | HEIGHT: 74 IN | HEART RATE: 84 BPM | DIASTOLIC BLOOD PRESSURE: 79 MMHG | RESPIRATION RATE: 18 BRPM | BODY MASS INDEX: 24.64 KG/M2 | SYSTOLIC BLOOD PRESSURE: 124 MMHG | WEIGHT: 192 LBS

## 2025-06-17 PROCEDURE — 6370000000 HC RX 637 (ALT 250 FOR IP): Performed by: PSYCHIATRY & NEUROLOGY

## 2025-06-17 PROCEDURE — 6370000000 HC RX 637 (ALT 250 FOR IP): Performed by: NURSE PRACTITIONER

## 2025-06-17 RX ORDER — PALIPERIDONE 6 MG/1
6 TABLET, EXTENDED RELEASE ORAL EVERY MORNING
Qty: 15 TABLET | Refills: 2 | Status: SHIPPED | OUTPATIENT
Start: 2025-06-17

## 2025-06-17 RX ORDER — DIVALPROEX SODIUM 500 MG/1
500 TABLET, DELAYED RELEASE ORAL EVERY 12 HOURS SCHEDULED
Qty: 30 TABLET | Refills: 3 | Status: SHIPPED | OUTPATIENT
Start: 2025-06-17

## 2025-06-17 RX ADMIN — PALIPERIDONE 3 MG: 3 TABLET, EXTENDED RELEASE ORAL at 09:37

## 2025-06-17 RX ADMIN — DIVALPROEX SODIUM 500 MG: 500 TABLET, DELAYED RELEASE ORAL at 09:37
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
## 2025-06-17 NOTE — DISCHARGE INSTRUCTIONS
Someone from [facility] will be calling you tomorrow to follow up on your care. If you don't hear from us, give us a call! [phone].    Keep all follow up appointments, take medications as ordered, utilize positive supports, abstain from use of alcohol and drugs. If symptoms return or you feel at risk to yourself or others, please call 911, return the nearest emergency room, or call your local crisis hotline:  [facility]: [phone]  [facility]: [phone]  [facility]: [phone]     For assistance with quitting smoking please go to https://smokefree.gov. A prescription for an FDA-approved tobacco cessation medication was offered at discharge and the patient refused.

## 2025-06-17 NOTE — GROUP NOTE
Date: 6/17/2025    Group Start Time: 1205  Group End Time: 1258  Group Topic: Music Therapy    [facility] [address] Chandni Chow    Live Music Group  Live Music Listening and Re-creative Singing    Patients will be given a songbook and offered the opportunity to select (a) song(s) of their choice for live music listening on [place]. Patients will be encouraged to sing along, move along, and listen to the music.    Focus: Building Positive Experiences and Relaxation  Goals: Increase/Maintain Level of Socialization, Improve Mood, Improve/Maintain Self-Esteem, Improve/Maintain Use of Coping Skills, and Promote Reality Orientation     Patient selected songs for live music listening and moved to familiar music. Patient socialized appropriately with select peers. Patient verbalized appreciation for groups during his admission.     Attended: 1/2-3/4 attendance  Participation Level: Active Listener and Interactive  Participation Quality: Attentive, Sharing, and Supportive  Affect/Mood: Congruent/Euthymic  Speech: normal rate and normal volume   Thought Content/Processes: Vague  Level of consciousness: Alert  Response: Able to verbalize current knowledge/experience  Outcomes: Actively participated in the group experience and Anticipated discharge today    Signature: Chandni Saleh, Licensed Professional Music Therapist (LPMT)

## 2025-06-17 NOTE — GROUP NOTE
Date: 6/17/2025    Group Start Time: 0905  Group End Time: 0955  Group Topic: Music Therapy    [facility] Chandni Chow    Challenging Negative Self-Talk  Composition/Songwriting, Live Music Listening, Mamta Analysis/Discussion, Re-creative Singing    Patients will listen to \"I Am Light\" by Jennifer Hutson and discuss interpretations, lyrics, and themes derived from the song. Patients will be asked to fill in the statements \"I am... I wonder... hear... want... worry... understand... dream... hope...\" to be used in a group mamta substitution. Patients will be encouraged to contribute lyrics and reflect on the experience as a group.    Focus: Building Positive Experiences, Challenging Negative Self-Talk  Goals: Improve Identity Development, Improve Self-Awareness, Increase/Maintain Level of Socialization, Improve Mood, Improve Self-Esteem, Improve Self-Expression, Develop/Improve Coping Skills    Patient listened to music and peer song discussions. Patient underlined some lyrics, and independently completed his mamta substitution. Patient contributed the line \"I am good with people\" to be included in group songwriting. Patient expressed that he enjoyed the re-creation.    Attended: 1/2-3/4 attendance  Participation Level: Active Listener and Interactive  Participation Quality: Attentive, Sharing, and Supportive  Affect/Mood: Congruent/Euthymic  Speech: normal rate and normal volume   Thought Content/Processes: Vague  Level of consciousness: Alert  Response: Able to verbalize current knowledge/experience  Outcomes: Actively participated in the group experience and Anticipated discharge today    Signature: Chandni Saleh, Licensed Professional Music Therapist (LPMT)

## 2025-06-17 NOTE — TRANSITION OF CARE
requires a referral for treatment? No  Patient referred to the following for substance/alcohol abuse treatment with an appointment? No N/A  Patient was offered medication to assist with substance/alcohol abuse cessation at discharge? No N/A      Patient discharged to: Home; Transition record discussed with patient/caregiver and provided this record in hard copy or electronically

## 2025-06-17 NOTE — PLAN OF CARE
Problem: Risk for Elopement  Goal: Patient will not exit the unit/facility without proper excort  6/12/2025 1212 by Beti Curry RN  Outcome: Progressing  6/12/2025 0100 by Светлана Thomas RN  Outcome: Progressing  Flowsheets (Taken 6/11/2025 1928 by Fariba Hall, RN)  Nursing Interventions for Elopement Risk:   Assist with personal care needs such as toileting, eating, dressing, as needed to reduce the risk of wandering   Reduce environmental triggers     Problem: Anxiety  Goal: Will report anxiety at manageable levels  Description: INTERVENTIONS:1. Administer medication as ordered2. Teach and rehearse alternative coping skills3. Provide emotional support with 1:1 interaction with staff  6/12/2025 1212 by Beti Curry RN  Outcome: Progressing  6/12/2025 0100 by Светлана Thomas RN  Outcome: Progressing  Flowsheets (Taken 6/11/2025 1928 by Fariba Hall, RN)  Will report anxiety at manageable levels:   Administer medication as ordered   Teach and rehearse alternative coping skills   Provide emotional support with 1:1 interaction with staff     Problem: Coping  Goal: Pt/Family able to verbalize concerns and demonstrate effective coping strategies  Description: INTERVENTIONS:1. Assist patient/family to identify coping skills, available support systems and cultural and spiritual values2. Provide emotional support, including active listening and acknowledgement of concerns of patient and caregivers3. Reduce environmental stimuli, as able4. Instruct patient/family in relaxation techniques, as appropriate5. Assess for spiritual pain/suffering and initiate Spiritual Care, Psychosocial Clinical Specialist consults as needed  6/12/2025 1212 by Beti Curry RN  Outcome: Progressing  6/12/2025 0100 by Светлана Thomas, RN  Outcome: Progressing  Flowsheets (Taken 6/11/2025 1928 by Fariba Hall, RN)  Patient/family able to verbalize anxieties, fears, and concerns, and demonstrate effective coping:   
  Problem: Risk for Elopement  Goal: Patient will not exit the unit/facility without proper excort  6/12/2025 2024 by Светлана Thomas RN  Outcome: Progressing  Flowsheets (Taken 6/12/2025 1213 by Beti Curry, RN)  Nursing Interventions for Elopement Risk: Reduce environmental triggers  6/12/2025 1212 by Beti Curry, RN  Outcome: Progressing     Problem: Anxiety  Goal: Will report anxiety at manageable levels  Description: INTERVENTIONS:1. Administer medication as ordered2. Teach and rehearse alternative coping skills3. Provide emotional support with 1:1 interaction with staff  6/12/2025 2024 by Светлана Thomas RN  Outcome: Progressing  Flowsheets (Taken 6/12/2025 1213 by Beti Curry, RN)  Will report anxiety at manageable levels:   Administer medication as ordered   Teach and rehearse alternative coping skills  6/12/2025 1212 by Beti Curry RN  Outcome: Progressing     Problem: Coping  Goal: Pt/Family able to verbalize concerns and demonstrate effective coping strategies  Description: INTERVENTIONS:1. Assist patient/family to identify coping skills, available support systems and cultural and spiritual values2. Provide emotional support, including active listening and acknowledgement of concerns of patient and caregivers3. Reduce environmental stimuli, as able4. Instruct patient/family in relaxation techniques, as appropriate5. Assess for spiritual pain/suffering and initiate Spiritual Care, Psychosocial Clinical Specialist consults as needed  6/12/2025 2024 by Светлана Thomas RN  Outcome: Progressing  Flowsheets (Taken 6/12/2025 1213 by Beti Curry, RN)  Patient/family able to verbalize anxieties, fears, and concerns, and demonstrate effective coping: Reduce environmental stimuli, as able  6/12/2025 1212 by Beti Curry RN  Outcome: Progressing     Problem: Depression/Self Harm  Goal: Effect of psychiatric condition will be minimized and patient will be protected from self 
  Problem: Risk for Elopement  Goal: Patient will not exit the unit/facility without proper excort  6/14/2025 2247 by Nasrin Gibbons RN  Outcome: Progressing  6/14/2025 1145 by Jayme Rush RN  Outcome: Progressing  Flowsheets (Taken 6/14/2025 1142)  Nursing Interventions for Elopement Risk:   Communicate/escalate to nursing supervisor the risk of elopement   Reduce environmental triggers   Make sure patient has all necessary personal care items   Communicate/escalate to charge nurse the risk of elopement     Problem: Anxiety  Goal: Will report anxiety at manageable levels  Description: INTERVENTIONS:1. Administer medication as ordered2. Teach and rehearse alternative coping skills3. Provide emotional support with 1:1 interaction with staff  6/14/2025 2247 by Nasrin Gibbons RN  Outcome: Progressing  6/14/2025 1145 by Jayme Rush RN  Outcome: Progressing  Flowsheets (Taken 6/14/2025 1142)  Will report anxiety at manageable levels: Provide emotional support with 1:1 interaction with staff     Problem: Coping  Goal: Pt/Family able to verbalize concerns and demonstrate effective coping strategies  Description: INTERVENTIONS:1. Assist patient/family to identify coping skills, available support systems and cultural and spiritual values2. Provide emotional support, including active listening and acknowledgement of concerns of patient and caregivers3. Reduce environmental stimuli, as able4. Instruct patient/family in relaxation techniques, as appropriate5. Assess for spiritual pain/suffering and initiate Spiritual Care, Psychosocial Clinical Specialist consults as needed  6/14/2025 2247 by Nasrin Gibbons RN  Outcome: Progressing  6/14/2025 1145 by Jayme Rush RN  Outcome: Progressing  Flowsheets (Taken 6/14/2025 1142)  Patient/family able to verbalize anxieties, fears, and concerns, and demonstrate effective coping: Provide emotional support, including active listening and acknowledgement of concerns of 
  Problem: Risk for Elopement  Goal: Patient will not exit the unit/facility without proper excort  6/16/2025 0855 by Dylan Pacheco RN  Outcome: Progressing  6/15/2025 2117 by Nasrin Gibbons RN  Outcome: Progressing     Problem: Anxiety  Goal: Will report anxiety at manageable levels  Description: INTERVENTIONS:1. Administer medication as ordered2. Teach and rehearse alternative coping skills3. Provide emotional support with 1:1 interaction with staff  6/16/2025 0855 by Dylan Pacheco RN  Outcome: Progressing  6/15/2025 2117 by Nasrin Gibbons RN  Outcome: Progressing     Problem: Coping  Goal: Pt/Family able to verbalize concerns and demonstrate effective coping strategies  Description: INTERVENTIONS:1. Assist patient/family to identify coping skills, available support systems and cultural and spiritual values2. Provide emotional support, including active listening and acknowledgement of concerns of patient and caregivers3. Reduce environmental stimuli, as able4. Instruct patient/family in relaxation techniques, as appropriate5. Assess for spiritual pain/suffering and initiate Spiritual Care, Psychosocial Clinical Specialist consults as needed  6/16/2025 0855 by Dlyan Pacheco RN  Outcome: Progressing  6/15/2025 2117 by Nasrin Gibbons RN  Outcome: Progressing     Problem: Depression/Self Harm  Goal: Effect of psychiatric condition will be minimized and patient will be protected from self harm  Description: INTERVENTIONS:1. Assess impact of patient's symptoms on level of functioning, self care needs and offer support as indicated2. Assess patient/family knowledge of depression, impact on illness and need for teaching3. Provide emotional support, presence and reassurance4. Assess for possible suicidal thoughts or ideation. If patient expresses suicidal thoughts or statements do not leave alone, initiate Suicide Precautions, move to a room close to the nursing station and obtain sitter5. 
  Problem: Risk for Elopement  Goal: Patient will not exit the unit/facility without proper excort  Outcome: Progressing  Flowsheets (Taken 6/11/2025 1928 by Fariba Hall, RN)  Nursing Interventions for Elopement Risk:   Assist with personal care needs such as toileting, eating, dressing, as needed to reduce the risk of wandering   Reduce environmental triggers     Problem: Anxiety  Goal: Will report anxiety at manageable levels  Description: INTERVENTIONS:1. Administer medication as ordered2. Teach and rehearse alternative coping skills3. Provide emotional support with 1:1 interaction with staff  Outcome: Progressing  Flowsheets (Taken 6/11/2025 1928 by Fariba Hall, RN)  Will report anxiety at manageable levels:   Administer medication as ordered   Teach and rehearse alternative coping skills   Provide emotional support with 1:1 interaction with staff     Problem: Coping  Goal: Pt/Family able to verbalize concerns and demonstrate effective coping strategies  Description: INTERVENTIONS:1. Assist patient/family to identify coping skills, available support systems and cultural and spiritual values2. Provide emotional support, including active listening and acknowledgement of concerns of patient and caregivers3. Reduce environmental stimuli, as able4. Instruct patient/family in relaxation techniques, as appropriate5. Assess for spiritual pain/suffering and initiate Spiritual Care, Psychosocial Clinical Specialist consults as needed  Outcome: Progressing  Flowsheets (Taken 6/11/2025 1928 by Fariba Hall, RN)  Patient/family able to verbalize anxieties, fears, and concerns, and demonstrate effective coping:   Assist patient/family to identify coping skills, available support systems and cultural and spiritual values   Reduce environmental stimuli, as able   Provide emotional support, including active listening and acknowledgement of concerns of patient and caregivers     Problem: Depression/Self Harm  Goal: Effect 
  Problem: Risk for Elopement  Goal: Patient will not exit the unit/facility without proper excort  Outcome: Progressing  Flowsheets (Taken 6/14/2025 1142)  Nursing Interventions for Elopement Risk:   Communicate/escalate to nursing supervisor the risk of elopement   Reduce environmental triggers   Make sure patient has all necessary personal care items   Communicate/escalate to charge nurse the risk of elopement     Problem: Anxiety  Goal: Will report anxiety at manageable levels  Description: INTERVENTIONS:1. Administer medication as ordered2. Teach and rehearse alternative coping skills3. Provide emotional support with 1:1 interaction with staff  Outcome: Progressing  Flowsheets (Taken 6/14/2025 1142)  Will report anxiety at manageable levels: Provide emotional support with 1:1 interaction with staff     Problem: Coping  Goal: Pt/Family able to verbalize concerns and demonstrate effective coping strategies  Description: INTERVENTIONS:1. Assist patient/family to identify coping skills, available support systems and cultural and spiritual values2. Provide emotional support, including active listening and acknowledgement of concerns of patient and caregivers3. Reduce environmental stimuli, as able4. Instruct patient/family in relaxation techniques, as appropriate5. Assess for spiritual pain/suffering and initiate Spiritual Care, Psychosocial Clinical Specialist consults as needed  Outcome: Progressing  Flowsheets (Taken 6/14/2025 1142)  Patient/family able to verbalize anxieties, fears, and concerns, and demonstrate effective coping: Provide emotional support, including active listening and acknowledgement of concerns of patient and caregivers     Problem: Depression/Self Harm  Goal: Effect of psychiatric condition will be minimized and patient will be protected from self harm  Description: INTERVENTIONS:1. Assess impact of patient's symptoms on level of functioning, self care needs and offer support as indicated2. 
  Problem: Risk for Elopement  Goal: Patient will not exit the unit/facility without proper excort  Outcome: Progressing  Flowsheets (Taken 6/16/2025 1014 by Dylan Pacheco RN)  Nursing Interventions for Elopement Risk:   Communicate/escalate to nursing supervisor the risk of elopement   Reduce environmental triggers   Make sure patient has all necessary personal care items   Communicate/escalate to charge nurse the risk of elopement     Problem: Anxiety  Goal: Will report anxiety at manageable levels  Description: INTERVENTIONS:1. Administer medication as ordered2. Teach and rehearse alternative coping skills3. Provide emotional support with 1:1 interaction with staff  Outcome: Progressing  Flowsheets (Taken 6/16/2025 1014 by Dylan Pacheco RN)  Will report anxiety at manageable levels:   Provide emotional support with 1:1 interaction with staff   Teach and rehearse alternative coping skills   Administer medication as ordered     Problem: Coping  Goal: Pt/Family able to verbalize concerns and demonstrate effective coping strategies  Description: INTERVENTIONS:1. Assist patient/family to identify coping skills, available support systems and cultural and spiritual values2. Provide emotional support, including active listening and acknowledgement of concerns of patient and caregivers3. Reduce environmental stimuli, as able4. Instruct patient/family in relaxation techniques, as appropriate5. Assess for spiritual pain/suffering and initiate Spiritual Care, Psychosocial Clinical Specialist consults as needed  Outcome: Progressing  Flowsheets (Taken 6/16/2025 1014 by Dylan Pacheco, RN)  Patient/family able to verbalize anxieties, fears, and concerns, and demonstrate effective coping:   Provide emotional support, including active listening and acknowledgement of concerns of patient and caregivers   Reduce environmental stimuli, as able   Assist patient/family to identify coping skills, available 
Patient is out social at times. Flat affect. Blunt. Denies both anxiety and depression. Denies SI.HI and AVH. Reports good sleep and appetite. Denies further needs at this time   Problem: Risk for Elopement  Goal: Patient will not exit the unit/facility without proper excort  Outcome: Progressing     Problem: Anxiety  Goal: Will report anxiety at manageable levels  Description: INTERVENTIONS:1. Administer medication as ordered2. Teach and rehearse alternative coping skills3. Provide emotional support with 1:1 interaction with staff  Outcome: Progressing     Problem: Coping  Goal: Pt/Family able to verbalize concerns and demonstrate effective coping strategies  Description: INTERVENTIONS:1. Assist patient/family to identify coping skills, available support systems and cultural and spiritual values2. Provide emotional support, including active listening and acknowledgement of concerns of patient and caregivers3. Reduce environmental stimuli, as able4. Instruct patient/family in relaxation techniques, as appropriate5. Assess for spiritual pain/suffering and initiate Spiritual Care, Psychosocial Clinical Specialist consults as needed  Outcome: Progressing     Problem: Depression/Self Harm  Goal: Effect of psychiatric condition will be minimized and patient will be protected from self harm  Description: INTERVENTIONS:1. Assess impact of patient's symptoms on level of functioning, self care needs and offer support as indicated2. Assess patient/family knowledge of depression, impact on illness and need for teaching3. Provide emotional support, presence and reassurance4. Assess for possible suicidal thoughts or ideation. If patient expresses suicidal thoughts or statements do not leave alone, initiate Suicide Precautions, move to a room close to the nursing station and obtain sitter5. Initiate consults as appropriate with Mental Health Professional, Spiritual Care, Psychosocial CNS, and consider a recommendation to the LIP 
Pt came to unit at 1810.  Pt pleasant and cooperative with assessment.  Pt admits to hearing voices that come from the walls.  Pt anxious.  Pt denies depression, denies SI, HI.  Oriented to unit.  According to pink slip pt has been delusional, paranoid, grandiose.  Pt is disorganized.  Pt reported he went to a bridge a month ago to jump but stopped himself.    Will con't to monitor.  
Assess patient/family knowledge of depression, impact on illness and need for teaching3. Provide emotional support, presence and reassurance4. Assess for possible suicidal thoughts or ideation. If patient expresses suicidal thoughts or statements do not leave alone, initiate Suicide Precautions, move to a room close to the nursing station and obtain sitter5. Initiate consults as appropriate with Mental Health Professional, Spiritual Care, Psychosocial CNS, and consider a recommendation to the LIP for a Psychiatric Consultation  Outcome: Progressing  Flowsheets (Taken 6/15/2025 1410)  Effect of psychiatric condition will be minimized and patient will be protected from self harm: Provide emotional support, presence and reassurance
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
## 2025-06-17 NOTE — DISCHARGE SUMMARY
polypharmacy  [] Augmentation of Clozapine therapy due to treatment resistance to single therapy        TIME SPEND - 35 MINUTES TO COMPLETE THE EVALUATION, DISCHARGE SUMMARY, MEDICATION RECONCILIATION AND FOLLOW UP CARE     Signed:  ENOCH KILPATRICK MD  6/17/2025  9:20 AM

## 2025-06-17 NOTE — FLOWSHEET NOTE
Reviewed discharge instructions with patient. Pt. Verbalized understanding. Denies SI/HI/AVH. Ambulatory off unit to cab with meds to beds.